# Patient Record
Sex: FEMALE | Race: WHITE | URBAN - METROPOLITAN AREA
[De-identification: names, ages, dates, MRNs, and addresses within clinical notes are randomized per-mention and may not be internally consistent; named-entity substitution may affect disease eponyms.]

---

## 2017-01-04 PROBLEM — Z37.9 NORMAL LABOR: Status: ACTIVE | Noted: 2017-01-04

## 2017-01-04 PROBLEM — O48.0 41 WEEKS GESTATION OF PREGNANCY: Status: ACTIVE | Noted: 2017-01-04

## 2017-01-04 PROBLEM — Z3A.41 41 WEEKS GESTATION OF PREGNANCY: Status: ACTIVE | Noted: 2017-01-04

## 2017-01-05 ENCOUNTER — ANESTHESIA EVENT (OUTPATIENT)
Dept: LABOR AND DELIVERY | Age: 22
End: 2017-01-05
Payer: COMMERCIAL

## 2017-01-05 ENCOUNTER — ANESTHESIA (OUTPATIENT)
Dept: LABOR AND DELIVERY | Age: 22
End: 2017-01-05
Payer: COMMERCIAL

## 2017-01-05 PROCEDURE — A4300 CATH IMPL VASC ACCESS PORTAL: HCPCS | Performed by: ANESTHESIOLOGY

## 2017-01-05 PROCEDURE — 74011250636 HC RX REV CODE- 250/636

## 2017-01-05 PROCEDURE — 77030014125 HC TY EPDRL BBMI -B: Performed by: ANESTHESIOLOGY

## 2017-01-05 RX ORDER — ROPIVACAINE HYDROCHLORIDE 2 MG/ML
INJECTION, SOLUTION EPIDURAL; INFILTRATION; PERINEURAL AS NEEDED
Status: DISCONTINUED | OUTPATIENT
Start: 2017-01-05 | End: 2017-01-05 | Stop reason: HOSPADM

## 2017-01-05 RX ORDER — ROPIVACAINE HYDROCHLORIDE 2 MG/ML
INJECTION, SOLUTION EPIDURAL; INFILTRATION; PERINEURAL
Status: DISCONTINUED | OUTPATIENT
Start: 2017-01-05 | End: 2017-01-05 | Stop reason: HOSPADM

## 2017-01-05 RX ADMIN — ROPIVACAINE HYDROCHLORIDE 10 ML/HR: 2 INJECTION, SOLUTION EPIDURAL; INFILTRATION; PERINEURAL at 09:45

## 2017-01-05 RX ADMIN — ROPIVACAINE HYDROCHLORIDE 8 ML: 2 INJECTION, SOLUTION EPIDURAL; INFILTRATION; PERINEURAL at 09:45

## 2017-01-05 NOTE — ANESTHESIA PREPROCEDURE EVALUATION
Anesthetic History   No history of anesthetic complications            Review of Systems / Medical History  Patient summary reviewed and pertinent labs reviewed    Pulmonary  Within defined limits                 Neuro/Psych   Within defined limits           Cardiovascular                  Exercise tolerance: >4 METS  Comments: Denies CV history   GI/Hepatic/Renal  Within defined limits              Endo/Other  Within defined limits           Other Findings              Physical Exam    Airway  Mallampati: II  TM Distance: 4 - 6 cm  Neck ROM: normal range of motion   Mouth opening: Normal     Cardiovascular    Rhythm: regular  Rate: normal         Dental  No notable dental hx       Pulmonary  Breath sounds clear to auscultation               Abdominal  GI exam deferred       Other Findings            Anesthetic Plan    ASA: 2  Anesthesia type: epidural            Anesthetic plan and risks discussed with: Patient and Mother

## 2017-01-05 NOTE — ANESTHESIA PROCEDURE NOTES
Epidural Block    Start time: 1/5/2017 9:38 AM  End time: 1/5/2017 9:48 AM  Performed by: Regi Perez  Authorized by: Nevaeh LEYVA     Pre-Procedure  Indication: labor epidural    Preanesthetic Checklist: patient identified, risks and benefits discussed, anesthesia consent, site marked, patient being monitored, timeout performed and anesthesia consent    Timeout Time: 09:37        Epidural:   Patient position:  Seated  Prep region:  Lumbar  Prep: Patient draped and Chlorhexidine    Location:  L3-4    Needle and Epidural Catheter:   Needle Type:  Tuohy  Needle Gauge:  17 G  Injection Technique:  Loss of resistance using saline  Attempts:  1  Catheter Size:  19 G  Catheter at Skin Depth (cm):  9  Depth in Epidural Space (cm):  5  Events: no blood with aspiration, no cerebrospinal fluid with aspiration, no paresthesia and negative aspiration test    Test Dose:  Lidocaine 1.5% w/ epi and negative    Assessment:   Catheter Secured:  Tegaderm and tape  Insertion:  Uncomplicated  Patient tolerance:  Patient tolerated the procedure well with no immediate complications

## 2017-01-05 NOTE — ANESTHESIA POSTPROCEDURE EVALUATION
Post-Anesthesia Evaluation and Assessment    Patient: Betty Richardson MRN: [id number]  SSN: [id number]    YOB: 1995  Age: 19 y.o. Sex: female       Cardiovascular Function/Vital Signs  Visit Vitals    /61    Pulse 82    Temp 36.8 °C (98.3 °F)    Resp 20    Breastfeeding Yes       Patient is status post epidural anesthesia for * No procedures listed *. Nausea/Vomiting: None    Postoperative hydration reviewed and adequate. Pain:  Pain Scale 1: Numeric (0 - 10) (01/05/17 1500)  Pain Intensity 1: 0 (01/05/17 1500)   Managed    Neurological Status: At baseline    Mental Status and Level of Consciousness: Arousable    Pulmonary Status:   O2 Device: Room air (01/05/17 0703)   Adequate oxygenation and airway patent    Complications related to anesthesia: None    Post-anesthesia assessment completed.  No concerns    Signed By: Venita Shipley MD     January 5, 2017

## 2017-01-31 PROBLEM — Z37.9 NORMAL LABOR: Status: RESOLVED | Noted: 2017-01-04 | Resolved: 2017-01-31

## 2017-01-31 PROBLEM — O48.0 41 WEEKS GESTATION OF PREGNANCY: Status: RESOLVED | Noted: 2017-01-04 | Resolved: 2017-01-31

## 2017-01-31 PROBLEM — R87.612 LGSIL ON PAP SMEAR OF CERVIX: Status: ACTIVE | Noted: 2017-01-31

## 2017-01-31 PROBLEM — Z3A.41 41 WEEKS GESTATION OF PREGNANCY: Status: RESOLVED | Noted: 2017-01-04 | Resolved: 2017-01-31

## 2020-06-11 PROBLEM — Z87.59 HISTORY OF POLYHYDRAMNIOS: Status: ACTIVE | Noted: 2020-06-11

## 2020-06-11 PROBLEM — R87.612 LGSIL ON PAP SMEAR OF CERVIX: Status: RESOLVED | Noted: 2017-01-31 | Resolved: 2020-06-11

## 2020-06-11 PROBLEM — F98.8 ADD (ATTENTION DEFICIT DISORDER): Status: ACTIVE | Noted: 2020-06-11

## 2020-06-11 PROBLEM — Z34.90 PREGNANCY: Status: ACTIVE | Noted: 2020-06-11

## 2020-12-27 ENCOUNTER — HOSPITAL ENCOUNTER (INPATIENT)
Age: 25
LOS: 3 days | Discharge: HOME | End: 2020-12-30
Attending: OBSTETRICS & GYNECOLOGY | Admitting: OBSTETRICS & GYNECOLOGY
Payer: COMMERCIAL

## 2020-12-27 PROBLEM — O26.893 ABDOMINAL PAIN DURING PREGNANCY IN THIRD TRIMESTER: Status: ACTIVE | Noted: 2020-12-27

## 2020-12-27 PROBLEM — R10.9 ABDOMINAL PAIN DURING PREGNANCY IN THIRD TRIMESTER: Status: ACTIVE | Noted: 2020-12-27

## 2020-12-27 LAB
ABO + RH BLD: NORMAL
BLOOD GROUP ANTIBODIES SERPL: NORMAL
ERYTHROCYTE [DISTWIDTH] IN BLOOD BY AUTOMATED COUNT: 13.6 % (ref 11.9–14.6)
HCT VFR BLD AUTO: 35.3 % (ref 35.8–46.3)
HGB BLD-MCNC: 11.9 G/DL (ref 11.7–15.4)
MCH RBC QN AUTO: 31.7 PG (ref 26.1–32.9)
MCHC RBC AUTO-ENTMCNC: 33.7 G/DL (ref 31.4–35)
MCV RBC AUTO: 94.1 FL (ref 79.6–97.8)
NRBC # BLD: 0 K/UL (ref 0–0.2)
PLATELET # BLD AUTO: 178 K/UL (ref 150–450)
PMV BLD AUTO: 11.6 FL (ref 9.4–12.3)
RBC # BLD AUTO: 3.75 M/UL (ref 4.05–5.2)
SPECIMEN EXP DATE BLD: NORMAL
WBC # BLD AUTO: 13.5 K/UL (ref 4.3–11.1)

## 2020-12-27 PROCEDURE — 85027 COMPLETE CBC AUTOMATED: CPT

## 2020-12-27 PROCEDURE — 4A1HXCZ MONITORING OF PRODUCTS OF CONCEPTION, CARDIAC RATE, EXTERNAL APPROACH: ICD-10-PCS | Performed by: OBSTETRICS & GYNECOLOGY

## 2020-12-27 PROCEDURE — 74011000258 HC RX REV CODE- 258: Performed by: OBSTETRICS & GYNECOLOGY

## 2020-12-27 PROCEDURE — 86901 BLOOD TYPING SEROLOGIC RH(D): CPT

## 2020-12-27 PROCEDURE — 74011250636 HC RX REV CODE- 250/636: Performed by: OBSTETRICS & GYNECOLOGY

## 2020-12-27 PROCEDURE — 59025 FETAL NON-STRESS TEST: CPT

## 2020-12-27 PROCEDURE — 99283 EMERGENCY DEPT VISIT LOW MDM: CPT

## 2020-12-27 PROCEDURE — 65270000029 HC RM PRIVATE

## 2020-12-27 RX ORDER — OXYTOCIN/RINGER'S LACTATE 30/500 ML
87.3 PLASTIC BAG, INJECTION (ML) INTRAVENOUS AS NEEDED
Status: COMPLETED | OUTPATIENT
Start: 2020-12-27 | End: 2020-12-28

## 2020-12-27 RX ORDER — LIDOCAINE HYDROCHLORIDE 20 MG/ML
JELLY TOPICAL
Status: DISCONTINUED | OUTPATIENT
Start: 2020-12-27 | End: 2020-12-28 | Stop reason: HOSPADM

## 2020-12-27 RX ORDER — MINERAL OIL
120 OIL (ML) ORAL
Status: COMPLETED | OUTPATIENT
Start: 2020-12-27 | End: 2020-12-28

## 2020-12-27 RX ORDER — OXYTOCIN/RINGER'S LACTATE 30/500 ML
10 PLASTIC BAG, INJECTION (ML) INTRAVENOUS AS NEEDED
Status: COMPLETED | OUTPATIENT
Start: 2020-12-27 | End: 2020-12-28

## 2020-12-27 RX ORDER — ONDANSETRON 2 MG/ML
4 INJECTION INTRAMUSCULAR; INTRAVENOUS
Status: DISCONTINUED | OUTPATIENT
Start: 2020-12-27 | End: 2020-12-28 | Stop reason: HOSPADM

## 2020-12-27 RX ORDER — SODIUM CHLORIDE 0.9 % (FLUSH) 0.9 %
5-40 SYRINGE (ML) INJECTION EVERY 8 HOURS
Status: DISCONTINUED | OUTPATIENT
Start: 2020-12-27 | End: 2020-12-28

## 2020-12-27 RX ORDER — CALCIUM CARBONATE 200(500)MG
400 TABLET,CHEWABLE ORAL
Status: DISCONTINUED | OUTPATIENT
Start: 2020-12-27 | End: 2020-12-28 | Stop reason: HOSPADM

## 2020-12-27 RX ORDER — DEXTROSE, SODIUM CHLORIDE, SODIUM LACTATE, POTASSIUM CHLORIDE, AND CALCIUM CHLORIDE 5; .6; .31; .03; .02 G/100ML; G/100ML; G/100ML; G/100ML; G/100ML
125 INJECTION, SOLUTION INTRAVENOUS CONTINUOUS
Status: DISCONTINUED | OUTPATIENT
Start: 2020-12-27 | End: 2020-12-28

## 2020-12-27 RX ORDER — LIDOCAINE HYDROCHLORIDE 10 MG/ML
1 INJECTION INFILTRATION; PERINEURAL
Status: DISCONTINUED | OUTPATIENT
Start: 2020-12-27 | End: 2020-12-28 | Stop reason: HOSPADM

## 2020-12-27 RX ORDER — BUTORPHANOL TARTRATE 2 MG/ML
1 INJECTION INTRAMUSCULAR; INTRAVENOUS
Status: DISCONTINUED | OUTPATIENT
Start: 2020-12-27 | End: 2020-12-28 | Stop reason: HOSPADM

## 2020-12-27 RX ORDER — SODIUM CHLORIDE 0.9 % (FLUSH) 0.9 %
5-40 SYRINGE (ML) INJECTION AS NEEDED
Status: DISCONTINUED | OUTPATIENT
Start: 2020-12-27 | End: 2020-12-28

## 2020-12-27 RX ADMIN — SODIUM CHLORIDE 5 MILLION UNITS: 900 INJECTION INTRAVENOUS at 21:55

## 2020-12-27 RX ADMIN — SODIUM CHLORIDE, SODIUM LACTATE, POTASSIUM CHLORIDE, CALCIUM CHLORIDE, AND DEXTROSE MONOHYDRATE 125 ML/HR: 600; 310; 30; 20; 5 INJECTION, SOLUTION INTRAVENOUS at 21:30

## 2020-12-28 ENCOUNTER — ANESTHESIA EVENT (OUTPATIENT)
Dept: LABOR AND DELIVERY | Age: 25
End: 2020-12-28
Payer: COMMERCIAL

## 2020-12-28 ENCOUNTER — ANESTHESIA (OUTPATIENT)
Dept: LABOR AND DELIVERY | Age: 25
End: 2020-12-28
Payer: COMMERCIAL

## 2020-12-28 PROBLEM — B95.1 POSITIVE GBS TEST: Status: ACTIVE | Noted: 2020-12-28

## 2020-12-28 LAB
ARTERIAL PATENCY WRIST A: ABNORMAL
ARTERIAL PATENCY WRIST A: ABNORMAL
BASE DEFICIT BLD-SCNC: 4 MMOL/L
BASE DEFICIT BLD-SCNC: 4 MMOL/L
BDY SITE: ABNORMAL
BDY SITE: ABNORMAL
CO2 BLD-SCNC: 25 MMOL/L
CO2 BLD-SCNC: 27 MMOL/L
COLLECT TIME,HTIME: 628
COLLECT TIME,HTIME: 628
GAS FLOW.O2 O2 DELIVERY SYS: ABNORMAL L/MIN
GAS FLOW.O2 O2 DELIVERY SYS: ABNORMAL L/MIN
HCO3 BLD-SCNC: 25.2 MMOL/L (ref 22–26)
HCO3 BLDV-SCNC: 23.1 MMOL/L (ref 23–28)
PCO2 BLDCO: 50 MMHG (ref 32–68)
PCO2 BLDCO: 61 MMHG (ref 32–68)
PH BLDCO: 7.23 [PH] (ref 7.15–7.38)
PH BLDCO: 7.27 [PH] (ref 7.15–7.38)
PO2 BLDCO: 20 MMHG
PO2 BLDCO: 9 MMHG
SAO2 % BLD: 6 % (ref 95–98)
SAO2 % BLDV: 24 % (ref 65–88)
SERVICE CMNT-IMP: ABNORMAL
SERVICE CMNT-IMP: ABNORMAL
SPECIMEN TYPE: ABNORMAL
SPECIMEN TYPE: ABNORMAL

## 2020-12-28 PROCEDURE — 74011250637 HC RX REV CODE- 250/637: Performed by: OBSTETRICS & GYNECOLOGY

## 2020-12-28 PROCEDURE — 74011000250 HC RX REV CODE- 250: Performed by: NURSE ANESTHETIST, CERTIFIED REGISTERED

## 2020-12-28 PROCEDURE — 00HU33Z INSERTION OF INFUSION DEVICE INTO SPINAL CANAL, PERCUTANEOUS APPROACH: ICD-10-PCS | Performed by: ANESTHESIOLOGY

## 2020-12-28 PROCEDURE — 82803 BLOOD GASES ANY COMBINATION: CPT

## 2020-12-28 PROCEDURE — 77030002888 HC SUT CHRMC J&J -A

## 2020-12-28 PROCEDURE — 74011250636 HC RX REV CODE- 250/636: Performed by: OBSTETRICS & GYNECOLOGY

## 2020-12-28 PROCEDURE — 75410000003 HC RECOV DEL/VAG/CSECN EA 0.5 HR

## 2020-12-28 PROCEDURE — 76060000078 HC EPIDURAL ANESTHESIA

## 2020-12-28 PROCEDURE — 77030018846 HC SOL IRR STRL H20 ICUM -A

## 2020-12-28 PROCEDURE — 74011250636 HC RX REV CODE- 250/636: Performed by: NURSE ANESTHETIST, CERTIFIED REGISTERED

## 2020-12-28 PROCEDURE — 2709999900 HC NON-CHARGEABLE SUPPLY

## 2020-12-28 PROCEDURE — 0UQGXZZ REPAIR VAGINA, EXTERNAL APPROACH: ICD-10-PCS | Performed by: OBSTETRICS & GYNECOLOGY

## 2020-12-28 PROCEDURE — 75410000002 HC LABOR FEE PER 1 HR

## 2020-12-28 PROCEDURE — 75410000000 HC DELIVERY VAGINAL/SINGLE

## 2020-12-28 PROCEDURE — A4300 CATH IMPL VASC ACCESS PORTAL: HCPCS | Performed by: ANESTHESIOLOGY

## 2020-12-28 PROCEDURE — 65270000029 HC RM PRIVATE

## 2020-12-28 PROCEDURE — 77030014125 HC TY EPDRL BBMI -B: Performed by: ANESTHESIOLOGY

## 2020-12-28 RX ORDER — ROPIVACAINE HYDROCHLORIDE 2 MG/ML
INJECTION, SOLUTION EPIDURAL; INFILTRATION; PERINEURAL AS NEEDED
Status: DISCONTINUED | OUTPATIENT
Start: 2020-12-28 | End: 2020-12-30 | Stop reason: HOSPADM

## 2020-12-28 RX ORDER — SIMETHICONE 80 MG
80 TABLET,CHEWABLE ORAL
Status: DISCONTINUED | OUTPATIENT
Start: 2020-12-28 | End: 2020-12-30 | Stop reason: HOSPADM

## 2020-12-28 RX ORDER — IBUPROFEN 800 MG/1
800 TABLET ORAL
Status: DISCONTINUED | OUTPATIENT
Start: 2020-12-28 | End: 2020-12-30 | Stop reason: HOSPADM

## 2020-12-28 RX ORDER — DIPHENHYDRAMINE HCL 25 MG
25 CAPSULE ORAL
Status: DISCONTINUED | OUTPATIENT
Start: 2020-12-28 | End: 2020-12-30 | Stop reason: HOSPADM

## 2020-12-28 RX ORDER — FENTANYL CITRATE 50 UG/ML
INJECTION, SOLUTION INTRAMUSCULAR; INTRAVENOUS AS NEEDED
Status: DISCONTINUED | OUTPATIENT
Start: 2020-12-28 | End: 2020-12-30 | Stop reason: HOSPADM

## 2020-12-28 RX ORDER — NALOXONE HYDROCHLORIDE 0.4 MG/ML
0.4 INJECTION, SOLUTION INTRAMUSCULAR; INTRAVENOUS; SUBCUTANEOUS AS NEEDED
Status: DISCONTINUED | OUTPATIENT
Start: 2020-12-28 | End: 2020-12-30 | Stop reason: HOSPADM

## 2020-12-28 RX ORDER — LIDOCAINE HYDROCHLORIDE AND EPINEPHRINE 15; 5 MG/ML; UG/ML
INJECTION, SOLUTION EPIDURAL AS NEEDED
Status: DISCONTINUED | OUTPATIENT
Start: 2020-12-28 | End: 2020-12-30 | Stop reason: HOSPADM

## 2020-12-28 RX ORDER — ROPIVACAINE HYDROCHLORIDE 2 MG/ML
INJECTION, SOLUTION EPIDURAL; INFILTRATION; PERINEURAL
Status: DISCONTINUED | OUTPATIENT
Start: 2020-12-28 | End: 2020-12-30 | Stop reason: HOSPADM

## 2020-12-28 RX ORDER — OXYCODONE AND ACETAMINOPHEN 7.5; 325 MG/1; MG/1
2 TABLET ORAL
Status: DISCONTINUED | OUTPATIENT
Start: 2020-12-28 | End: 2020-12-30 | Stop reason: HOSPADM

## 2020-12-28 RX ORDER — ZOLPIDEM TARTRATE 5 MG/1
5 TABLET ORAL
Status: DISCONTINUED | OUTPATIENT
Start: 2020-12-28 | End: 2020-12-30 | Stop reason: HOSPADM

## 2020-12-28 RX ORDER — OXYCODONE AND ACETAMINOPHEN 5; 325 MG/1; MG/1
1 TABLET ORAL
Status: DISCONTINUED | OUTPATIENT
Start: 2020-12-28 | End: 2020-12-30 | Stop reason: HOSPADM

## 2020-12-28 RX ORDER — IBUPROFEN 400 MG/1
400 TABLET ORAL
Status: DISCONTINUED | OUTPATIENT
Start: 2020-12-28 | End: 2020-12-28

## 2020-12-28 RX ADMIN — IBUPROFEN 800 MG: 800 TABLET ORAL at 21:06

## 2020-12-28 RX ADMIN — MINERAL OIL 120 ML: 1000 LIQUID ORAL at 06:03

## 2020-12-28 RX ADMIN — FENTANYL CITRATE 100 MCG: 50 INJECTION INTRAMUSCULAR; INTRAVENOUS at 01:44

## 2020-12-28 RX ADMIN — PENICILLIN G POTASSIUM 2.5 MILLION UNITS: 20000000 POWDER, FOR SOLUTION INTRAVENOUS at 02:00

## 2020-12-28 RX ADMIN — LIDOCAINE HYDROCHLORIDE,EPINEPHRINE BITARTRATE 3 ML: 15; .005 INJECTION, SOLUTION EPIDURAL; INFILTRATION; INTRACAUDAL; PERINEURAL at 00:49

## 2020-12-28 RX ADMIN — Medication 87.3 MILLI-UNITS/MIN: at 07:31

## 2020-12-28 RX ADMIN — IBUPROFEN 400 MG: 400 TABLET, FILM COATED ORAL at 07:30

## 2020-12-28 RX ADMIN — Medication 10000 MILLI-UNITS: at 06:32

## 2020-12-28 RX ADMIN — IBUPROFEN 800 MG: 800 TABLET ORAL at 15:13

## 2020-12-28 RX ADMIN — Medication 8 ML: at 00:52

## 2020-12-28 RX ADMIN — PENICILLIN G POTASSIUM 2.5 MILLION UNITS: 20000000 POWDER, FOR SOLUTION INTRAVENOUS at 06:00

## 2020-12-28 RX ADMIN — IBUPROFEN 400 MG: 400 TABLET, FILM COATED ORAL at 11:43

## 2020-12-28 RX ADMIN — ROPIVACAINE HYDROCHLORIDE 8 ML/HR: 2 INJECTION, SOLUTION EPIDURAL; INFILTRATION at 00:52

## 2020-12-28 NOTE — PROGRESS NOTES
Raheem Hinson at bedside at [place] [address]. HEBER Ngo at bedside at [address] [facility] pt to sitting up on bedside at [place] [address]. Timeout completed at [phone] with MD, HEBER and myself at bedside. Test dose given at 0049. Negative reaction. Dose given at 0052. Pt assisted to lying back in left tilt position. See anesthesia record for details. See vital sign flow sheet for BP. Tolerated procedure well.

## 2020-12-28 NOTE — ANESTHESIA PREPROCEDURE EVALUATION
Anesthetic History   No history of anesthetic complications            Review of Systems / Medical History  Patient summary reviewed and pertinent labs reviewed    Pulmonary  Within defined limits                 Neuro/Psych   Within defined limits           Cardiovascular                  Exercise tolerance: >4 METS     GI/Hepatic/Renal  Within defined limits              Endo/Other  Within defined limits           Other Findings              Physical Exam    Airway  Mallampati: II  TM Distance: 4 - 6 cm  Neck ROM: normal range of motion   Mouth opening: Normal     Cardiovascular    Rhythm: regular  Rate: normal         Dental  No notable dental hx       Pulmonary  Breath sounds clear to auscultation               Abdominal  GI exam deferred       Other Findings            Anesthetic Plan    ASA: 2  Anesthesia type: epidural            Anesthetic plan and risks discussed with: Patient and Spouse

## 2020-12-28 NOTE — LACTATION NOTE
[address] [facility] [address] [phone]               Thank you for choosing us for your health care visit with Osmin Suh MD.  We are glad to serve you and happy to provide you with this summary of your This note was copied from a baby's chart. In to see mom and infant for first time. 2nd baby. Breast fed first x 8 months. Good supply. Used nipple shield w/ first baby. States this  today has latched and fed well twice, did not use nipple shield. Reviewed 1st 24 hr feeding/output expectations. Discussed options in obtaining personal pump through insurance this time as well per parents request. Baby asleep in bassinet, fed recently. No needs or questions at this time. Current Medications          This list is accurate as of: 3/1/17 11:54 AM.  Always use your most recent med list.                Amitriptyline HCl 10 MG Tabs   TAKE ONE TABLET BY MOUTH ONCE EVERY EVENING   What changed:  See the new instructions.    Commonl

## 2020-12-28 NOTE — L&D DELIVERY NOTE
Delivery Summary    Patient: Isi Bajwa MRN: [id number]  SSN: [id number]    YOB: 1995  Age: 30 y.o. Sex: female       Information for the patient's :  Lázrao Fuentes [[id number]]       Labor Events:    Labor: No    Steroids: None   Cervical Ripening Date/Time:       Cervical Ripening Type: None   Antibiotics During Labor: Yes   Rupture Identifier:      Rupture Date/Time: 2020 7:30 PM   Rupture Type: SROM   Amniotic Fluid Volume: Moderate    Amniotic Fluid Description: Clear    Amniotic Fluid Odor:      Induction: None       Induction Date/Time:        Indications for Induction:      Augmentation: None   Augmentation Date/Time:      Indications for Augmentation:     Labor complications: None       Additional complications:        Delivery Events:  Indications For Episiotomy:     Episiotomy: None   Perineal Laceration(s): None   Repaired:     Periurethral Laceration Location:      Repaired:     Labial Laceration Location:     Repaired:     Sulcal Laceration Location:     Repaired:     Vaginal Laceration Location:     Repaired: Yes   Cervical Laceration Location:     Repaired:     Repair Suture: Chromic 3-0   Number of Repair Packets:     Estimated Blood Loss (ml): 200ml   Quantitative Blood Loss (ml)                Delivery Date: 2020    Delivery Time: 6:28 AM  Delivery Type: Vaginal, Spontaneous  Sex:  Male    Gestational Age: 37w1d   Delivery Clinician:  Christina Overton  Living Status: Living   Delivery Location: L&D 428          APGARS  One minute Five minutes Ten minutes   Skin color:            Heart rate:            Grimace:            Muscle tone:            Breathing: Totals:                Presentation: Vertex    Position: Left Occiput Anterior  Resuscitation Method:  Suctioning-bulb; Tactile Stimulation     Meconium Stained: None      Cord Information: 3 Vessels  Complications: None  Cord around:    Delayed cord clamping?  Yes  Cord clamped date/time:   Disposition of Cord Blood: Lab    Blood Gases Sent?: Yes    Placenta:  Date/Time: 2020  6:32 AM  Removal: Spontaneous      Appearance: Normal      Measurements:  Birth Weight:        Birth Length:        Head Circumference:        Chest Circumference:       Abdominal Girth: Other Providers:   FAZAL Gustafson;LEX HOLDER;;;;ADRIENNE WESTBROOK;[place]ela ALFARO, Obstetrician;Primary Nurse;Primary  Nurse;Nicu Nurse;Neonatologist;Anesthesiologist;Crna;Nurse Practitioner;Midwife;Nursery Nurse           Group B Strep: No results found for: GRBSEXT, GRBSEXT  Information for the patient's :  Lázaro Gina [[id number]]   No results found for: ABORH, PCTABR, PCTDIG, BILI, ABORHEXT, ABORH     No results for input(s): PCO2CB, PO2CB, HCO3I, SO2I, IBD, PTEMPI, SPECTI, PHICB, ISITE, IDEV, IALLEN in the last 72 hours.  over small vaginal laceration repaired with single stitch. No shoulder dystocia. Spontaneous delivery of placenta. Repair in usual fashion of superficial lac. Excellent hemostasis and cosmesis.

## 2020-12-28 NOTE — PROGRESS NOTES
Verbal report given to Hector Sibley RN  (full name & credentials) on this patient, who is now being transferred to MIU (unit) for routine progression of care. The patient is not wearing a green \"Anesthesia-Duramorph\" band.     Report consisted of patient's Situation, Background, Assessment and Recommendations (SBAR).    [place] ID bands were compared with the identification form, and verified with the patient and receiving nurse.     Information from the SBAR, Procedure Summary, Intake/Output, MAR and Recent Results and the Jung Report was reviewed with the receiving nurse; opportunity for questions and clarification provided.     Pt transferred to room 458 via w/c.

## 2020-12-28 NOTE — PROGRESS NOTES
Report received from Junie Garcia RN care assumed. Pt sitting up in bed holding baby. Assessment complete see flowsheet. No complaints at this time. Pt in bed with call light in reach.

## 2020-12-28 NOTE — PROGRESS NOTES
Admission assessment complete as noted. Patient oriented to room and unit. Plan of care reviewed and patient verbalizes understanding. Questions encouraged and answered. Patent encouraged to call for needs or concerns. Safety Teaching reviewed:   1. Hand hygiene prior to handling the infant. 2. Use of bulb syringe. 3. Bracelets with matching numbers are placed on mother and infant  4. An infant security tag  [facility]) is placed on the infant's ankle and monitored  5. All OB nurses wear pink Employee badges - do not give your baby to anyone without proper identification. 6. Never leave the baby alone in the room. 7. The infant should be placed on their back to sleep. on a firm mattress. No toys should be placed in the crib. (safe sleep video offered to view)  8. Never shake the baby (video offered to view)  9. Infant fall prevention - do not sleep with the baby, and place the baby in the crib while ambulating. 8. Mother and Baby Care booklet given to Mother.

## 2020-12-28 NOTE — PROGRESS NOTES
SBAR OUT Report: Mother    Verbal report given to Marcio Phillips RN  (full name & credentials) on this patient, who is now being transferred to MIU (unit) for routine progression of care. The patient is not wearing a green \"Anesthesia-Duramorph\" band. Report consisted of patient's Situation, Background, Assessment and Recommendations (SBAR). [place] ID bands were compared with the identification form, and verified with the patient and receiving nurse. Information from the SBAR, Procedure Summary, Intake/Output, MAR and Recent Results and the Jung Report was reviewed with the receiving nurse; opportunity for questions and clarification provided. Pt transferred to room 458 via w/c.

## 2020-12-28 NOTE — PROGRESS NOTES
SVE 4/90/-2      In and out cath for 500 cc of clear yellow urine. Placed on peanut. Some discomfort in one tiny spot of left side of abdomen. CRNA called to bedside to evaluate. See her note.

## 2020-12-28 NOTE — ROUTINE PROCESS
SBAR IN Report: Mother Verbal report received from Shen Medrano RN on this patient, who is now being transferred from L&D (unit) for routine progression of care. The patient is not wearing a green \"Anesthesia-Duramorph\" band. Report consisted of patient's Situation, Background, Assessment and Recommendations (SBAR). [place] ID bands were compared with the identification form, and verified with the patient and transferring nurse. Information from the SBAR and the Jung Report was reviewed with the transferring nurse; opportunity for questions and clarification provided.

## 2020-12-28 NOTE — ANESTHESIA PROCEDURE NOTES
Epidural Block    Patient location during procedure: OB  Start time: 12/28/2020 12:44 AM  End time: 12/28/2020 12:52 AM  Reason for block: primary anesthetic, at surgeon's request and labor epidural  Staffing  Performed: attending   Anesthesiologist: Patricia Parkinson MD  Preanesthetic Checklist  Completed: patient identified, IV checked, site marked, risks and benefits discussed, surgical consent, monitors and equipment checked, pre-op evaluation and timeout performed  Block Placement  Patient position: sitting  Prep: ChloraPrep  Sterility prep: cap, drape, gloves, hand and mask  Sedation level: no sedation  Patient monitoring: heart rate  Approach: midline  Location: lumbar  Lumbar location: L3-L4  Epidural  Loss of resistance technique: saline  Guidance: landmark technique  Needle  Needle type: Tuohy   Needle gauge: 17 G  Needle length: 9 cm  Needle insertion depth: 4.5 cm  Catheter size: 19 G  Catheter at skin depth: 9 cm  Catheter securement method: clear occlusive dressing, liquid medical adhesive and surgical tape  Test dose: negative  Assessment  Number of attempts: 1  Procedure assessment: patient tolerated procedure well with no complications

## 2020-12-28 NOTE — H&P
History & Physical    Name: Isi Bajwa MRN: [id number]  SSN: [id number]    YOB: 1995  Age: 30 y.o. Sex: female        Subjective: LOF  29 yo  at 39 wks presents with LOF starting at 1. Also c/o ctxs. Reports normal FM. Denies vb. Pregnancy has been uncomplicated. GBS is positive. Estimated Date of Delivery: 21  OB History    Para Term  AB Living   2 1 1     1   SAB TAB Ectopic Molar Multiple Live Births           0 1      # Outcome Date GA Lbr Zachariah/2nd Weight Sex Delivery Anes PTL Lv   2 Current            1 Term 17 41w1d 18:30 / 00:20 3.045 kg M Vag-Spont EPIDURAL AN N ROSANGELA         Past Medical History:   Diagnosis Date    ADD (attention deficit disorder)     Chlamydia     HPV in female     LGSIL on Pap smear of cervix     Supervision of high-risk pregnancy 10/6/2016     Past Surgical History:   Procedure Laterality Date    HX COLPOSCOPY  2018    HX TYMPANOSTOMY      HX WISDOM TEETH EXTRACTION       Social History     Occupational History    Not on file   Tobacco Use    Smoking status: Never Smoker    Smokeless tobacco: Never Used   Substance and Sexual Activity    Alcohol use: Not Currently    Drug use: No    Sexual activity: Yes     Partners: Male     Birth control/protection: None     Family History   Problem Relation Age of Onset    Diabetes Father     Hypertension Father     Heart Disease Father     Ovarian Cancer Maternal Grandmother     Stroke Paternal Grandmother     No Known Problems Mother     No Known Problems Sister     Heart Attack Maternal Grandfather     Stroke Maternal Grandfather     Stroke Paternal Grandfather        No Known Allergies  Prior to Admission medications    Medication Sig Start Date End Date Taking? Authorizing Provider   multivit 47/iron/folate 1/dha (PNV-DHA PO) Take  by mouth.    Yes Provider, Historical   simethicone (Gas-X) 125 mg capsule Take 125 mg by mouth four (4) times daily as needed for Flatulence. Provider, Historical        Review of Systems: Pertinent items are noted in HPI. 10 point ROS is otherwise negative. Objective:     Vitals:  Vitals:    20   BP:  112/77   Pulse:  88   Resp:  20   Temp:  98.6 °F (37 °C)   Weight: 79.8 kg (176 lb)    Height: 5' 2\" (1.575 m)         Physical Exam:  Patient without distress. Heart: Regular rate and rhythm  Lung: clear to auscultation throughout lung fields and normal respiratory effort  Abdomen: soft, nontender  Fundus: soft and non tender  Perineum: blood absent, amniotic fluid present  Cervical Exam: 1 cm dilated    80% effaced    -2 station    Lower Extremities:  - Edema 1+  Membranes:  Spontaneous Rupture of Membranes; Amniotic Fluid: clear fluid  Fetal Heart Rate: Baseline: 120s per minute  Variability: moderate  Accelerations: yes  Decelerations: none  Uterine contractions: not picking up at this time  Amnisure: POSITIVE    Prenatal Labs:   Lab Results   Component Value Date/Time    ABO/Rh(D) O POSITIVE 2017 07:00 PM    Rubella, External immune 2020    HBsAg, External negative 2020    HIV, External NR 2020    RPR, External NR 2020    ABO,Rh O positive 2020         Assessment/Plan: 31 yo  at 37 wks presents with SROM at 1930 and mild ctxs. Active Problems:    * No active hospital problems. *       Plan: Admit for labor. PCN per IV for GBS prophylaxis.   Epidural prn

## 2020-12-28 NOTE — LACTATION NOTE

## 2020-12-28 NOTE — PROGRESS NOTES
Fundus firm/ at umbilicus. Scan bleeding noted. Pt did have one small prune size clot expressed with no bleeding after. Pitocin still infusing. Pt denies pain. Thalia care complete. Thalia pad changed and mesh underwear applied. Pt in bed with call light in reach. Will get up ready to move to MIU.

## 2020-12-29 PROCEDURE — 74011250637 HC RX REV CODE- 250/637: Performed by: OBSTETRICS & GYNECOLOGY

## 2020-12-29 PROCEDURE — 2709999900 HC NON-CHARGEABLE SUPPLY

## 2020-12-29 PROCEDURE — 65270000029 HC RM PRIVATE

## 2020-12-29 RX ADMIN — IBUPROFEN 800 MG: 800 TABLET ORAL at 10:28

## 2020-12-29 RX ADMIN — IBUPROFEN 800 MG: 800 TABLET ORAL at 23:14

## 2020-12-29 RX ADMIN — IBUPROFEN 800 MG: 800 TABLET ORAL at 17:02

## 2020-12-29 RX ADMIN — IBUPROFEN 800 MG: 800 TABLET ORAL at 04:35

## 2020-12-29 NOTE — PROGRESS NOTES
Chart reviewed - no needs identified. SW met with patient while social distancing and wearing appropriate PPE. Patient denies any history of postpartum depression/anxiety. Patient does not have a PCP as she typically goes to urgent care. Patient denied 's offer to provide assistance with linking her with a PCP. Patient given informational packet on  mood & anxiety disorders (resources/education). Family denies any additional needs from  at this time. Family has 's contact information should any needs/questions arise.     DAMIR Castillo-RAJAN  [facility]

## 2020-12-29 NOTE — LACTATION NOTE
In to follow up with mom and infant. Mom stated that infant nursed well during the night but has been fussy today and will latch and take a few sucks and then fall asleep. Informed mom and dad that this is normal and reviewed the second night of life. Mom stated that otherwise she has no concerns. Lactation consultant will follow up tomorrow.

## 2020-12-29 NOTE — PROGRESS NOTES
Post-Partum Day Number 1 Progress Note    Patient doing well post-partum without significant complaint. Voiding without difficulty, normal lochia. Vitals:  No data found. Temp (24hrs), Av °F (36.7 °C), Min:97.9 °F (36.6 °C), Max:98 °F (36.7 °C)      Vital signs stable, afebrile. Exam:  Patient without distress. Abdomen soft, fundus firm at level of umbilicus, nontender               Perineum with normal lochia noted. Lower extremities are negative for swelling, cords or tenderness. Lab/Data Review: All lab results for the last 24 hours reviewed. Assessment and Plan:  Patient appears to be having uncomplicated post-partum course. Continue routine perineal care and maternal education. Plan discharge tomorrow if no problems occur.

## 2020-12-30 VITALS
HEART RATE: 64 BPM | RESPIRATION RATE: 16 BRPM | OXYGEN SATURATION: 97 % | SYSTOLIC BLOOD PRESSURE: 100 MMHG | DIASTOLIC BLOOD PRESSURE: 73 MMHG | HEIGHT: 62 IN | WEIGHT: 176 LBS | BODY MASS INDEX: 32.39 KG/M2 | TEMPERATURE: 97.5 F

## 2020-12-30 PROCEDURE — 74011250637 HC RX REV CODE- 250/637: Performed by: OBSTETRICS & GYNECOLOGY

## 2020-12-30 PROCEDURE — 2709999900 HC NON-CHARGEABLE SUPPLY

## 2020-12-30 RX ORDER — BREAST PUMP
1 EACH MISCELLANEOUS AS NEEDED
Qty: 1 DEVICE | Refills: 0 | Status: SHIPPED | OUTPATIENT
Start: 2020-12-30

## 2020-12-30 RX ADMIN — IBUPROFEN 800 MG: 800 TABLET ORAL at 11:08

## 2020-12-30 RX ADMIN — IBUPROFEN 800 MG: 800 TABLET ORAL at 05:10

## 2020-12-30 NOTE — PROGRESS NOTES
Patient discharged to home per MD orders. Discharge instructions reviewed with patient. Questions encouraged and answered. Patient verbalizes understanding. Patient escorted by MIU staff to private vehicle. Stable at discharge. Add 52 Modifier (Optional): no Medical Necessity Clause: This procedure was medically necessary because the lesions that were treated were: Medical Necessity Information: It is in your best interest to select a reason for this procedure from the list below. All of these items fulfill various CMS LCD requirements except the new and changing color options. Strength: Clarence Curette Text: Prior to application of cantharidin the lesions were lightly pared with a curette. Post-Care Instructions: I reviewed with the patient in detail post-care instructions. The patient understands that the treated areas should be washed off 6 to 8 hours after application. Detail Level: Detailed Consent: The patient's consent was obtained including but not limited to risks of crusting, scabbing, scarring, blistering, darker or lighter pigmentary change, recurrence, incomplete removal and infection.

## 2020-12-30 NOTE — LACTATION NOTE
In to follow up with mom and infant prior to discharge to home. Mom and dad stated that infant cluster fed some during the night. He was circumcised early this am and then would not wake up to nurse. Informed mom and dad that this is normal and he may sleep for several hours before he wakes to nurse again. Mom had requested to pump earlier as her milk is coming in. She expressed 20 ml from her left breast. She has a personal breastpump at home to use as needed. Instructed her to take her breast pump kit home with her. Reviewed discharge information and answered questions. Mom and infant are following up with [facility] and will see lactation consultant there.

## 2020-12-30 NOTE — PROGRESS NOTES
Post-Partum Day Number 2 Progress/Discharge Note    Patient doing well post-partum without significant complaint. Voiding without difficulty, normal lochia, positive flatus. Vitals:    Patient Vitals for the past 8 hrs:   BP Temp Pulse Resp SpO2   20 0746 100/73 97.5 °F (36.4 °C) 64 16 97 %     Temp (24hrs), Av.9 °F (36.6 °C), Min:97.5 °F (36.4 °C), Max:98.2 °F (36.8 °C)      Vital signs stable, afebrile. Exam:  Patient without distress. Abdomen soft, fundus firm at level of umbilicus, non tender               Perineum with normal lochia noted. Lower extremities are negative for swelling, cords or tenderness. Lab/Data Review: All lab results for the last 24 hours reviewed. Assessment and Plan:  Patient appears to be having uncomplicated post-partum course. Continue routine perineal care and maternal education. Plan discharge for today with follow up in our office in 6 weeks.

## 2020-12-30 NOTE — DISCHARGE INSTRUCTIONS
Patient Education   Discharge instruction to follow: Activity: Pelvis rest for 6 weeks     No heavy lifting over 15 lbs for 2 weeks     No driving for 2 weeks     No push/pull motion such as sweeping or vacuuming for 2 weeks     No tub baths for 6 weeks    If using love-bottle continue to use until comfortable stopping. Change sanitary pad after each urination or bowel movement. Call MD for the following:      Fever over 101 F; pain not relieved by medication; foul smelling vaginal discharge or an increase in vaginal bleeding. Take medication as prescribed. Follow up with MD as order. Vaginal Childbirth: Care Instructions  Overview     Vaginal birth means delivering a baby through the birth canal (vagina). During labor, the uterus tightens (contracts) regularly to thin and open the cervix and to push the baby out through the birth canal.  Your body will slowly heal in the next few weeks. It's easy to get too tired and overwhelmed during the first weeks after your baby is born. Changes in your hormones can shift your mood without warning. You may find it hard to meet the extra demands on your energy and time. Take it easy on yourself. Follow-up care is a key part of your treatment and safety. Be sure to make and go to all appointments, and call your doctor if you are having problems. It's also a good idea to know your test results and keep a list of the medicines you take. How can you care for yourself at home? Vaginal bleeding and cramps  · After delivery, you will have a bloody discharge from your vagina. This will turn pink within a week and then white or yellow after about 10 days. It may last for 2 to 4 weeks or longer, until the uterus has healed. Use pads instead of tampons until you stop bleeding. · Don't worry if you pass some blood clots, as long as they are smaller than a golf ball. If you have a tear or stitches in your vaginal area, change the pad at least every 4 hours.  This will help prevent soreness and infection. · You may have cramps for the first few days after childbirth. These are normal and occur as the uterus shrinks to normal size. Take an over-the-counter pain medicine, such as acetaminophen (Tylenol), ibuprofen (Advil, Motrin), or naproxen (Aleve), for cramps. Read and follow all instructions on the label. Do not take aspirin, because it can cause more bleeding. · Do not take two or more pain medicines at the same time unless the doctor told you to. Many pain medicines have acetaminophen, which is Tylenol. Too much acetaminophen (Tylenol) can be harmful. Stitches  · If you have stitches, they will dissolve on their own and don't need to be removed. Follow your doctor's instructions for cleaning the stitched area. · Put ice or a cold pack on your painful area for 10 to 20 minutes at a time, several times a day, for the first few days. Put a thin cloth between the ice and your skin. · Sit in a few inches of warm water (sitz bath) 3 times a day and after bowel movements. The warm water helps with pain and itching. If you don't have a tub, a warm shower might help. Breast fullness  · Your breasts may overfill (engorge) in the first few days after delivery. To help milk flow and to relieve pain, warm your breasts in the shower or by using warm, moist towels before nursing. · If you aren't nursing, don't put warmth on your breasts or touch your breasts. Wear a tight bra or sports bra and use ice until the fullness goes away. This usually takes 2 to 3 days. · Put ice or a cold pack on your breast after nursing to reduce swelling and pain. Put a thin cloth between the ice and your skin. Activity  · Eat a balanced diet. Don't try to lose weight by cutting calories. Keep taking your prenatal vitamins, or take a multivitamin. · Get as much rest as you can. Try to take naps when your baby sleeps during the day. · Get some exercise every day.  But don't do any heavy exercise until your doctor says it is okay. · Wait until you are healed (about 4 to 6 weeks) before you have sexual intercourse. Your doctor will tell you when it is okay to have sex. · Talk to your doctor about birth control. You can get pregnant even before your period returns. Also, you can get pregnant while you are breastfeeding. Mental health  · It's normal to have some sadness, anxiety, sleeplessness, and mood swings after you go home. If you feel upset or hopeless for more than a few days or are having trouble doing the things you need to do, talk to your doctor. Constipation and hemorrhoids  · Drink plenty of fluids, enough so that your urine is light yellow or clear like water. If you have kidney, heart, or liver disease and have to limit fluids, talk with your doctor before you increase the amount of fluids you drink. · Eat plenty of fiber each day. Have a bran muffin or bran cereal for breakfast. Try eating a piece of fruit for a mid-afternoon snack. · For painful, itchy hemorrhoids, put ice or a cold pack on the area several times a day for 10 minutes at a time. Follow this by putting a warm compress on the area for another 10 to 20 minutes or by sitting in a shallow, warm bath. When should you call for help? Call  911 anytime you think you may need emergency care. For example, call if:    · You have thoughts of harming yourself, your baby, or another person.     · You passed out (lost consciousness).     · You have chest pain, are short of breath, or cough up blood.     · You have a seizure. Call your doctor now or seek immediate medical care if:    · You have severe vaginal bleeding.     · You are dizzy or lightheaded, or you feel like you may faint.     · You have a fever.     · You have new or more pain in your belly or pelvis.     · You have symptoms of a blood clot in your leg (called a deep vein thrombosis), such as:  ? Pain in the calf, back of the knee, thigh, or groin. ?  Redness and swelling in your leg or groin.     · You have signs of preeclampsia, such as:  ? Sudden swelling of your face, hands, or feet. ? New vision problems (such as dimness, blurring, or seeing spots). ? A severe headache. Watch closely for changes in your health, and be sure to contact your doctor if:    · Your vaginal bleeding seems to be getting heavier.     · You have new or worse vaginal discharge.     · You feel sad, anxious, or hopeless for more than a few days.     · You do not get better as expected. Where can you learn more? Go to http://www.gray.com/  Enter Q237 in the search box to learn more about \"Vaginal Childbirth: Care Instructions. \"  Current as of: February 11, 2020               Content Version: 12.6  © 8359-2779 [employer]. Care instructions adapted under license by [employer] (which disclaims liability or warranty for this information). If you have questions about a medical condition or this instruction, always ask your healthcare professional. [place] [address] any warranty or liability for your use of this information.

## 2020-12-30 NOTE — PROGRESS NOTES
Shift assessment complete as noted. Patient has questions regarding pumping. RN spends several minutes answering questions. POC for the day reviewed. Encouraged to call for needs or concerns. Verbalizes understanding.

## 2020-12-30 NOTE — LACTATION NOTE
This note was copied from a baby's chart. RN to room per mother request.  Infant just returned from circumcision. Mother notes her milk is coming in and would like to pump. Mother states \"I'm leaking and my breasts of full\". Denies discomfort. Breasts are firm bilaterally although no large areas of firmness noted. Educated on care of breasts at home to help with firmness and prevent engorgement. RN offers to help latch infant. Infant latches to right side but does not maintain latch for more than 5 minutes. Pump and parts provided per request.  RN recommends mother pump only long enough to pull off acsess milk. Voices understanding.

## 2020-12-30 NOTE — ANESTHESIA POSTPROCEDURE EVALUATION
* No procedures listed *. No value filed.     Anesthesia Post Evaluation        Patient location during evaluation: floor  Patient participation: complete - patient participated  Pain management: adequate  Airway patency: patent  Anesthetic complications: no  Cardiovascular status: acceptable  Respiratory status: acceptable  Hydration status: acceptable  Post anesthesia nausea and vomiting:  controlled  Final Post Anesthesia Temperature Assessment:  Normothermia (36.0-37.5 degrees C)      INITIAL Post-op Vital signs:  Visit Vitals  /73 (BP 1 Location: Left arm, BP Patient Position: At rest)   Pulse 64   Temp 36.4 °C (97.5 °F)   Resp 16   Ht 5' 2\" (1.575 m)   Wt 79.8 kg (176 lb)   SpO2 97%   Breastfeeding Unknown   BMI 32.19 kg/m²

## 2021-01-04 NOTE — DISCHARGE SUMMARY
Obstetrical Discharge Summary     Name: Corinne Owens MRN: [id number]  SSN: [id number]    YOB: 1995  Age: 30 y.o. Sex: female      Allergies: Patient has no known allergies. Admit Date: 2020    Discharge Date: 2021     Admitting Physician: Minda Spaulding MD     Attending Physician:  No att. providers found     * Admission Diagnoses: Abdominal pain during pregnancy in third trimester [O26.893, R10.9]    * Discharge Diagnoses:   Information for the patient's :  Mer[facility] Montes [[id number]]   Delivery of a 6 lb 4.9 oz (2.86 kg) male infant via Vaginal, Spontaneous on 2020 at 6:28 AM  by [employer]. Apgars were 9  and 9 . Additional Diagnoses:   Hospital Problems as of 2020 Date Reviewed: 2020          Codes Class Noted - Resolved POA    * (Principal) Abdominal pain during pregnancy in third trimester ICD-10-CM: O26.893, R10.9  ICD-9-CM: 646.83, 789.00  2020 - Present Unknown             Lab Results   Component Value Date/Time    ABO/Rh(D) O POSITIVE 2020 09:32 PM    Rubella, External immune 2020    ABO,Rh O positive 2020      Immunization History   Administered Date(s) Administered    MMR 2017    Tdap 2016       * Procedures:   * No surgery found *           * Discharge Condition: good    * Hospital Course: Normal hospital course following the delivery. * Disposition: Home    Discharge Medications:   Discharge Medication List as of 2020 10:24 AM      CONTINUE these medications which have NOT CHANGED    Details   multivit 47/iron/folate 1/dha (PNV-DHA PO) Take  by mouth., Historical Med         STOP taking these medications       simethicone (Gas-X) 125 mg capsule Comments:   Reason for Stopping:               * Follow-up Care/Patient Instructions:   Activity: No sex for 6 weeks      Follow-up Information     Follow up With Specialties Details Why Contact Info    None    None ([phone]) Patient stated that they have no PCP      Jovita Robledo, DO Obstetrics & Gynecology, Gynecology, Obstetrics In 2 weeks  [address] [place] [address] 32  [address]  [phone]

## 2021-01-18 PROBLEM — R10.9 ABDOMINAL PAIN DURING PREGNANCY IN THIRD TRIMESTER: Status: RESOLVED | Noted: 2020-12-27 | Resolved: 2021-01-18

## 2021-01-18 PROBLEM — Z34.90 PREGNANCY: Status: RESOLVED | Noted: 2020-06-11 | Resolved: 2021-01-18

## 2021-01-18 PROBLEM — O26.893 ABDOMINAL PAIN DURING PREGNANCY IN THIRD TRIMESTER: Status: RESOLVED | Noted: 2020-12-27 | Resolved: 2021-01-18

## 2021-01-18 PROBLEM — Z87.59 HISTORY OF POLYHYDRAMNIOS: Status: RESOLVED | Noted: 2020-06-11 | Resolved: 2021-01-18

## 2022-03-18 PROBLEM — F98.8 ADD (ATTENTION DEFICIT DISORDER): Status: ACTIVE | Noted: 2020-06-11

## 2022-03-19 PROBLEM — B95.1 POSITIVE GBS TEST: Status: ACTIVE | Noted: 2020-12-28

## 2022-08-05 ENCOUNTER — INITIAL PRENATAL (OUTPATIENT)
Dept: OBGYN CLINIC | Age: 27
End: 2022-08-05
Payer: COMMERCIAL

## 2022-08-05 VITALS — WEIGHT: 161.8 LBS | HEIGHT: 62 IN | BODY MASS INDEX: 29.77 KG/M2

## 2022-08-05 DIAGNOSIS — Z34.81 PRENATAL CARE, SUBSEQUENT PREGNANCY, FIRST TRIMESTER: Primary | ICD-10-CM

## 2022-08-05 DIAGNOSIS — Z32.01 PREGNANCY TEST POSITIVE: ICD-10-CM

## 2022-08-05 DIAGNOSIS — O36.80X0 ENCOUNTER TO DETERMINE FETAL VIABILITY OF PREGNANCY, SINGLE OR UNSPECIFIED FETUS: ICD-10-CM

## 2022-08-05 DIAGNOSIS — Z3A.08 8 WEEKS GESTATION OF PREGNANCY: ICD-10-CM

## 2022-08-05 DIAGNOSIS — Z23 ENCOUNTER FOR IMMUNIZATION: ICD-10-CM

## 2022-08-05 DIAGNOSIS — Z87.59 HISTORY OF POLYHYDRAMNIOS: ICD-10-CM

## 2022-08-05 PROBLEM — B95.1 POSITIVE GBS TEST: Status: RESOLVED | Noted: 2020-12-28 | Resolved: 2022-08-05

## 2022-08-05 PROBLEM — Z34.90 PREGNANCY: Status: ACTIVE | Noted: 2022-08-05

## 2022-08-05 LAB
ABO + RH BLD: NORMAL
ABO, EXTERNAL RESULT: NORMAL
BASOPHILS # BLD: 0 K/UL (ref 0–0.2)
BASOPHILS NFR BLD: 0 % (ref 0–2)
BLOOD GROUP ANTIBODIES SERPL: NORMAL
DIFFERENTIAL METHOD BLD: NORMAL
EOSINOPHIL # BLD: 0.1 K/UL (ref 0–0.8)
EOSINOPHIL NFR BLD: 1 % (ref 0.5–7.8)
ERYTHROCYTE [DISTWIDTH] IN BLOOD BY AUTOMATED COUNT: 13.4 % (ref 11.9–14.6)
HBV SURFACE AG SER QL: NONREACTIVE
HCG, PREGNANCY, URINE, POC: POSITIVE
HCT VFR BLD AUTO: 38.2 % (ref 35.8–46.3)
HEP B, EXTERNAL RESULT: NORMAL
HEPATITIS C ANTIBODY, EXTERNAL RESULT: NORMAL
HGB BLD-MCNC: 12.7 G/DL (ref 11.7–15.4)
HIV 1+2 AB+HIV1 P24 AG SERPL QL IA: NONREACTIVE
HIV 1/2 RESULT COMMENT: NORMAL
HIV, EXTERNAL RESULT: NORMAL
IMM GRANULOCYTES # BLD AUTO: 0 K/UL (ref 0–0.5)
IMM GRANULOCYTES NFR BLD AUTO: 0 % (ref 0–5)
LYMPHOCYTES # BLD: 1.6 K/UL (ref 0.5–4.6)
LYMPHOCYTES NFR BLD: 19 % (ref 13–44)
MCH RBC QN AUTO: 31 PG (ref 26.1–32.9)
MCHC RBC AUTO-ENTMCNC: 33.2 G/DL (ref 31.4–35)
MCV RBC AUTO: 93.2 FL (ref 79.6–97.8)
MONOCYTES # BLD: 0.4 K/UL (ref 0.1–1.3)
MONOCYTES NFR BLD: 5 % (ref 4–12)
NEUTS SEG # BLD: 6.2 K/UL (ref 1.7–8.2)
NEUTS SEG NFR BLD: 75 % (ref 43–78)
NRBC # BLD: 0 K/UL (ref 0–0.2)
PLATELET # BLD AUTO: 214 K/UL (ref 150–450)
PMV BLD AUTO: 10.7 FL (ref 9.4–12.3)
RBC # BLD AUTO: 4.1 M/UL (ref 4.05–5.2)
RH FACTOR, EXTERNAL RESULT: POSITIVE
RPR, EXTERNAL RESULT: NORMAL
RUBELLA TITER, EXTERNAL RESULT: NORMAL
RUBV IGG SERPL IA-ACNC: 37.3 IU/ML (ref 0–50)
VALID INTERNAL CONTROL, POC: YES
WBC # BLD AUTO: 8.3 K/UL (ref 4.3–11.1)

## 2022-08-05 PROCEDURE — 76817 TRANSVAGINAL US OBSTETRIC: CPT | Performed by: NURSE PRACTITIONER

## 2022-08-05 PROCEDURE — 81025 URINE PREGNANCY TEST: CPT | Performed by: NURSE PRACTITIONER

## 2022-08-05 NOTE — PROGRESS NOTES
Jennifer Montes G3, P2 presents to the office today for NOB talk and ultrasound. EDC is 3/14/23 based off of ultrasound. Patient education was discussed including: nutrition, appropriate weight gain, diet, exercise, travel, hospital classes, breastfeeding/lactation services, flu vaccine, Tdap, glucola, GBS, and Corona Virus and Zika precautions. Genetic testing was declined. Patients past medical history is significant for ADD and polyhydramnios in 2017. She is to return to the office in 4 weeks for NOB exam. All questions answered and she voiced full understanding. She is encouraged to call the office with any questions or concerns.

## 2022-08-06 LAB
EST. AVERAGE GLUCOSE BLD GHB EST-MCNC: 91 MG/DL
HBA1C MFR BLD: 4.8 % (ref 4.8–5.6)

## 2022-08-07 LAB
HCV AB S/CO SERPL IA: <0.1 S/CO RATIO (ref 0–0.9)
HCV AB SERPL QL IA: NORMAL

## 2022-08-08 LAB
BACTERIA SPEC CULT: NORMAL
RPR SER QL: NONREACTIVE
SERVICE CMNT-IMP: NORMAL
VZV IGG SER IA-ACNC: 246 INDEX

## 2022-08-09 DIAGNOSIS — Z87.59 HISTORY OF POLYHYDRAMNIOS: ICD-10-CM

## 2022-09-02 ENCOUNTER — ROUTINE PRENATAL (OUTPATIENT)
Dept: OBGYN CLINIC | Age: 27
End: 2022-09-02

## 2022-09-02 VITALS — DIASTOLIC BLOOD PRESSURE: 70 MMHG | WEIGHT: 160.6 LBS | BODY MASS INDEX: 29.37 KG/M2 | SYSTOLIC BLOOD PRESSURE: 110 MMHG

## 2022-09-02 DIAGNOSIS — Z34.81 PRENATAL CARE, SUBSEQUENT PREGNANCY, FIRST TRIMESTER: ICD-10-CM

## 2022-09-02 DIAGNOSIS — Z3A.12 12 WEEKS GESTATION OF PREGNANCY: Primary | ICD-10-CM

## 2022-09-02 DIAGNOSIS — Z13.89 SCREENING FOR GENITOURINARY CONDITION: ICD-10-CM

## 2022-09-02 DIAGNOSIS — Z11.3 SCREENING EXAMINATION FOR STD (SEXUALLY TRANSMITTED DISEASE): ICD-10-CM

## 2022-09-02 DIAGNOSIS — Z12.4 SCREENING FOR CERVICAL CANCER: ICD-10-CM

## 2022-09-02 LAB
C. TRACHOMATIS, EXTERNAL RESULT: NEGATIVE
N. GONORRHOEAE, EXTERNAL RESULT: NEGATIVE

## 2022-09-02 PROCEDURE — 99902 PR PRENATAL VISIT: CPT | Performed by: OBSTETRICS & GYNECOLOGY

## 2022-09-02 NOTE — PROGRESS NOTES
Ms. Reynaldo Galo  presents today for her initial OB exam.  She has occ nausea that is helped with frequent eating. Last pap smear:2019 Negative  Genetic testing: Declines    No LMP recorded. Patient is pregnant. Estimated Date of Delivery: 3/14/23  OB History:   OB History    Para Term  AB Living   3 2 2 0 0 2   SAB IAB Ectopic Molar Multiple Live Births   0 0 0 0 0 2      # Outcome Date GA Lbr Say/2nd Weight Sex Delivery Anes PTL Lv   3 Current            2 Term 20 37w1d 10:30 / 00:28 6 lb 4.9 oz (2.86 kg) M Vag-Spont EPI N LINDA      Name: Noemi Bates: 9  Apgar5: 9   1 Term 17 41w1d 18:30 / 00:20 6 lb 11.4 oz (3.045 kg) M Vag-Spont  N LINDA      Name: Noemi Bates: 9  Apgar5: 10     Lives in [facility] 89 years old   Past Gyn History:   GYN History         Regular cycles. Hx of cryo? In past.  Std with tx. Allergies:   No Known Allergies    Medication History:  Current Outpatient Medications   Medication Sig Dispense Refill    Prenat w/o U-QD-Psewxxz-FA-DHA (PNV-DHA PO) Take by mouth       No current facility-administered medications for this visit. Past Medical History:  Past Medical History:   Diagnosis Date    ADD (attention deficit disorder)     Chlamydia     HPV in female     LGSIL on Pap smear of cervix     Supervision of high-risk pregnancy 10/6/2016       Past Surgical History:  Past Surgical History:   Procedure Laterality Date    COLPOSCOPY  2018    TYMPANOSTOMY TUBE PLACEMENT      WISDOM TOOTH EXTRACTION         Social History:  Social History     Socioeconomic History    Marital status:  2.5 years.       Spouse name: Not on file    Number of children: Not on file    Years of education: Not on file    Highest education level: Not on file   Occupational History    Dental hygienist    Tobacco Use    Smoking status: Never    Smokeless tobacco: Never   Vaping Use    Vaping Use: Never used   Substance and Sexual Activity    Alcohol use: Not Currently    Drug use: No    Sexual activity: Yes     Partners: Male     Birth control/protection: None   Other Topics Concern    Exercise:  active    Social History Narrative    Not on file     Social Determinants of Health     Financial Resource Strain: Not on file   Food Insecurity: Not on file   Transportation Needs: Not on file   Physical Activity: Not on file   Stress: Not on file   Social Connections: Not on file   Intimate Partner Violence: Not on file   Housing Stability: Not on file       Family History:  Family History   Problem Relation Age of Onset    Stroke Paternal Grandmother     Stroke Paternal Grandfather     Stroke Maternal Grandfather     Heart Attack Maternal Grandfather     No Known Problems Sister     No Known Problems Mother     Ovarian Cancer Maternal Grandmother     Heart Disease Father     Hypertension Father     Diabetes Father            Review of Systems      A comprehensive review of systems was negative except for that written in the history of present illness. /70   Wt 160 lb 9.6 oz (72.8 kg)   BMI 29.37 kg/m²   Protein, Urine, POC   Date Value Ref Range Status   12/24/2020 Negative Negative Final   General: well nourished well developed female in nad   Heart rrr  Lungs cta b&s  Abdomen soft nontender. No enlargement of liver. Extremities: no calf pain  Pelvic exam: normal external genitalia, vulva, vagina, cervix, uterus and adnexa. Breasts: breasts appear normal, no suspicious masses, no skin or nipple changes or axillary nodes. Assessment and Plan:     Diagnoses and all orders for this visit:    12 weeks gestation of pregnancy  -     AMB POC URINALYSIS DIP STICK AUTO W/O MICRO  -     PAP IG, CT-NG-TV, rfx Aptima HPV ASCUS ([id number]);  Future    Prenatal care, subsequent pregnancy, first trimester  -     AMB POC URINALYSIS DIP STICK AUTO W/O MICRO  -     PAP IG, CT-NG-TV, rfx Aptima HPV ASCUS ([id number]); Future    Screening for cervical cancer  -     PAP IG, CT-NG-TV, rfx Aptima HPV ASCUS ([id number]); Future    Screening examination for STD (sexually transmitted disease)  -     PAP IG, CT-NG-TV, rfx Aptima HPV ASCUS ([id number]); Future    Screening for genitourinary condition  -     AMB POC URINALYSIS DIP STICK AUTO W/O MICRO        Counseling was performed regarding expected weight gain, exercise, travel and limitation of travel to [place] affected areas, risks of PTL,  avoidance of cat feces and raw material.  Discussed genetic testing and pt declines. All questions were answered. RTC 4 weeks.

## 2022-09-07 LAB
C TRACH RRNA CVX QL NAA+PROBE: NEGATIVE
CYTOLOGIST CVX/VAG CYTO: NORMAL
CYTOLOGY CVX/VAG DOC THIN PREP: NORMAL
HPV REFLEX: NORMAL
Lab: NORMAL
N GONORRHOEA RRNA CVX QL NAA+PROBE: NEGATIVE
PATH REPORT.FINAL DX SPEC: NORMAL
STAT OF ADQ CVX/VAG CYTO-IMP: NORMAL
T VAGINALIS RRNA SPEC QL NAA+PROBE: NEGATIVE

## 2022-09-30 ENCOUNTER — ROUTINE PRENATAL (OUTPATIENT)
Dept: OBGYN CLINIC | Age: 27
End: 2022-09-30

## 2022-09-30 VITALS — BODY MASS INDEX: 29.85 KG/M2 | WEIGHT: 163.2 LBS | DIASTOLIC BLOOD PRESSURE: 62 MMHG | SYSTOLIC BLOOD PRESSURE: 110 MMHG

## 2022-09-30 DIAGNOSIS — Z34.82 PRENATAL CARE, SUBSEQUENT PREGNANCY, SECOND TRIMESTER: Primary | ICD-10-CM

## 2022-09-30 DIAGNOSIS — Z3A.16 16 WEEKS GESTATION OF PREGNANCY: ICD-10-CM

## 2022-09-30 DIAGNOSIS — Z13.89 SCREENING FOR GENITOURINARY CONDITION: ICD-10-CM

## 2022-09-30 DIAGNOSIS — Z87.59 HISTORY OF POLYHYDRAMNIOS: ICD-10-CM

## 2022-09-30 LAB
GLUCOSE URINE, POC: NEGATIVE
PROTEIN,URINE, POC: NEGATIVE

## 2022-09-30 PROCEDURE — 99902 PR PRENATAL VISIT: CPT | Performed by: OBSTETRICS & GYNECOLOGY

## 2022-10-28 ENCOUNTER — ROUTINE PRENATAL (OUTPATIENT)
Dept: OBGYN CLINIC | Age: 27
End: 2022-10-28
Payer: COMMERCIAL

## 2022-10-28 VITALS — BODY MASS INDEX: 29.52 KG/M2 | SYSTOLIC BLOOD PRESSURE: 114 MMHG | WEIGHT: 161.4 LBS | DIASTOLIC BLOOD PRESSURE: 68 MMHG

## 2022-10-28 DIAGNOSIS — Z36.3 ENCOUNTER FOR ROUTINE SCREENING FOR FETAL MALFORMATION USING ULTRASOUND: Primary | ICD-10-CM

## 2022-10-28 DIAGNOSIS — Z34.82 PRENATAL CARE, SUBSEQUENT PREGNANCY, SECOND TRIMESTER: ICD-10-CM

## 2022-10-28 DIAGNOSIS — Z3A.20 20 WEEKS GESTATION OF PREGNANCY: ICD-10-CM

## 2022-10-28 DIAGNOSIS — Z13.89 SCREENING FOR GENITOURINARY CONDITION: ICD-10-CM

## 2022-10-28 LAB
GLUCOSE URINE, POC: NEGATIVE
PROTEIN,URINE, POC: NEGATIVE

## 2022-10-28 PROCEDURE — 76805 OB US >/= 14 WKS SNGL FETUS: CPT | Performed by: NURSE PRACTITIONER

## 2022-10-28 PROCEDURE — 99902 PR PRENATAL VISIT: CPT | Performed by: NURSE PRACTITIONER

## 2022-10-28 NOTE — PROGRESS NOTES
Doing well. No complaints. Suboptimal IVS. Unable to see spine due to fetal position. Currently all visualied anatomy WNL. RTC 4 weeks for recheck anatomy. Anatomy:   Single IUP + FHM. FHR = 411  Cephalic presentation  Anatomy scan not complete. Unable to visualize spine due to fetal position.  Suboptimal IVS.  EIF noted in left ventricle  Placenta is posterior grade 0  Cervix = 4.6 cm

## 2022-11-23 ENCOUNTER — ROUTINE PRENATAL (OUTPATIENT)
Dept: OBGYN CLINIC | Age: 27
End: 2022-11-23
Payer: COMMERCIAL

## 2022-11-23 VITALS — SYSTOLIC BLOOD PRESSURE: 110 MMHG | DIASTOLIC BLOOD PRESSURE: 60 MMHG | WEIGHT: 170.2 LBS | BODY MASS INDEX: 31.13 KG/M2

## 2022-11-23 DIAGNOSIS — Z3A.24 24 WEEKS GESTATION OF PREGNANCY: ICD-10-CM

## 2022-11-23 DIAGNOSIS — Z13.89 SCREENING FOR GENITOURINARY CONDITION: ICD-10-CM

## 2022-11-23 DIAGNOSIS — Z34.82 PRENATAL CARE, SUBSEQUENT PREGNANCY, SECOND TRIMESTER: ICD-10-CM

## 2022-11-23 DIAGNOSIS — Z36.2 ENCOUNTER FOR FOLLOW-UP ULTRASOUND OF FETAL ANATOMY: Primary | ICD-10-CM

## 2022-11-23 LAB
GLUCOSE URINE, POC: NEGATIVE
PROTEIN,URINE, POC: NEGATIVE

## 2022-11-23 PROCEDURE — 76816 OB US FOLLOW-UP PER FETUS: CPT | Performed by: NURSE PRACTITIONER

## 2022-11-23 PROCEDURE — 99902 PR PRENATAL VISIT: CPT | Performed by: NURSE PRACTITIONER

## 2022-11-23 NOTE — PROGRESS NOTES
Doing well. No complaints. Anatomy scan complete. RTC 4 weeks for glucola. PTL precautions and [address] reviewed. Single IUP +FHM FHR = 135  Presentation cephalic  Anatomy scan complete. Visualized anatomy WNL. Spine and IVS seen today. EIF still noted in LV.    Placenta is posterior grade 0

## 2022-12-22 ENCOUNTER — ROUTINE PRENATAL (OUTPATIENT)
Dept: OBGYN CLINIC | Age: 27
End: 2022-12-22

## 2022-12-22 VITALS — SYSTOLIC BLOOD PRESSURE: 118 MMHG | BODY MASS INDEX: 31.53 KG/M2 | WEIGHT: 172.4 LBS | DIASTOLIC BLOOD PRESSURE: 79 MMHG

## 2022-12-22 DIAGNOSIS — Z3A.28 28 WEEKS GESTATION OF PREGNANCY: Primary | ICD-10-CM

## 2022-12-22 DIAGNOSIS — Z13.1 SCREENING FOR DIABETES MELLITUS: ICD-10-CM

## 2022-12-22 DIAGNOSIS — Z13.89 SCREENING FOR GENITOURINARY CONDITION: ICD-10-CM

## 2022-12-22 DIAGNOSIS — Z13.0 SCREENING, ANEMIA, DEFICIENCY, IRON: ICD-10-CM

## 2022-12-22 DIAGNOSIS — Z34.92 PRENATAL CARE, SECOND TRIMESTER: ICD-10-CM

## 2022-12-22 DIAGNOSIS — Z23 ENCOUNTER FOR IMMUNIZATION: ICD-10-CM

## 2022-12-22 PROCEDURE — 99902 PR PRENATAL VISIT: CPT | Performed by: STUDENT IN AN ORGANIZED HEALTH CARE EDUCATION/TRAINING PROGRAM

## 2022-12-22 RX ORDER — AMOXICILLIN 500 MG/1
CAPSULE ORAL
COMMUNITY
Start: 2022-12-15

## 2022-12-22 NOTE — PROGRESS NOTES
Currently on amoxicillin for a ruptured ear drum. Glucola performed today. Reports edema in bilateral ankles. Denies HA, floaters, blurry vision. Tdap considering.

## 2022-12-23 ENCOUNTER — TELEPHONE (OUTPATIENT)
Dept: OBGYN CLINIC | Age: 27
End: 2022-12-23

## 2022-12-23 DIAGNOSIS — Z13.1 SCREENING FOR DIABETES MELLITUS: Primary | ICD-10-CM

## 2022-12-23 DIAGNOSIS — Z34.83 PRENATAL CARE, SUBSEQUENT PREGNANCY, THIRD TRIMESTER: ICD-10-CM

## 2022-12-23 DIAGNOSIS — Z3A.29 29 WEEKS GESTATION OF PREGNANCY: ICD-10-CM

## 2022-12-23 NOTE — TELEPHONE ENCOUNTER
----- Message from Moris Jiménez MD sent at 12/23/2022  9:51 AM EST -----  Lab for pregnant patient.   Failed 1-hr GTT, needs 3-hr GCT

## 2022-12-23 NOTE — TELEPHONE ENCOUNTER
Called patient and LM informed of abnormal GTT. 3 hour GTT scheduled. NPO after midnight. Patient v/u.     Orders Placed This Encounter   Procedures    Gestational GTT, 3 HR     Standing Status:   Future     Standing Expiration Date:   12/30/2022

## 2023-01-06 ENCOUNTER — ROUTINE PRENATAL (OUTPATIENT)
Dept: OBGYN CLINIC | Age: 28
End: 2023-01-06

## 2023-01-06 VITALS — WEIGHT: 173.2 LBS | BODY MASS INDEX: 31.68 KG/M2 | SYSTOLIC BLOOD PRESSURE: 120 MMHG | DIASTOLIC BLOOD PRESSURE: 74 MMHG

## 2023-01-06 DIAGNOSIS — Z87.59 HISTORY OF POLYHYDRAMNIOS: ICD-10-CM

## 2023-01-06 DIAGNOSIS — Z23 ENCOUNTER FOR IMMUNIZATION: ICD-10-CM

## 2023-01-06 DIAGNOSIS — Z3A.30 30 WEEKS GESTATION OF PREGNANCY: ICD-10-CM

## 2023-01-06 DIAGNOSIS — Z34.93 PRENATAL CARE, THIRD TRIMESTER: Primary | ICD-10-CM

## 2023-01-06 DIAGNOSIS — Z13.89 SCREENING FOR GENITOURINARY CONDITION: ICD-10-CM

## 2023-01-06 LAB
GLUCOSE URINE, POC: NEGATIVE
PROTEIN,URINE, POC: NEGATIVE

## 2023-01-06 NOTE — PROGRESS NOTES
32 y.o.  at 30w3d  who is here for routine OB visit. Tdap desires, will give today. HISTORY:  OB History    Para Term  AB Living   3 2 2     2   SAB IAB Ectopic Molar Multiple Live Births             2      # Outcome Date GA Lbr Say/2nd Weight Sex Delivery Anes PTL Lv   3 Current            2 Term 20 37w1d 10:30 / 00:28 6 lb 4.9 oz (2.86 kg) M Vag-Spont EPI N LINDA   1 Term 17 41w1d 18:30 / 00:20 6 lb 11.4 oz (3.045 kg) M Vag-Spont  N LINDA      No LMP recorded. Patient is pregnant. Social History     Substance and Sexual Activity   Sexual Activity Yes    Partners: Male    Birth control/protection: None      Past Medical History:   Diagnosis Date    ADD (attention deficit disorder)     Chlamydia     HPV in female     LGSIL on Pap smear of cervix     Supervision of high-risk pregnancy 10/6/2016      Past Surgical History:   Procedure Laterality Date    COLPOSCOPY  2018    TYMPANOSTOMY TUBE PLACEMENT      WISDOM TOOTH EXTRACTION         ROS:  Feeling well. No dyspnea or chest pain on exertion. No abdominal pain, change in bowel habits, black or bloody stools. No urinary tract symptoms. OB ROS: No vaginal bleeding, cxns, LOF, decreased FM. No HA, vision changes, abdominal pain. PHYSICAL EXAM:  /74   Wt 173 lb 3.2 oz (78.6 kg)   BMI 31.68 kg/m²        ASSESSMENT / PLAN:  1. Prenatal care, third trimester  -     AMB POC OB URINE DIP  2. 30 weeks gestation of pregnancy  Overview:  Genetics-declines  Orders:  -     AMB POC OB URINE DIP  3. History of polyhydramnios  Overview:  2017  A1C (4.8)  28 week GTT-153  3 hour GTT-passed 79, 187, 155, 128  4. Encounter for immunization  Overview:  Covid-not received  Flu- declined  Tdap- given 23  5.  Screening for genitourinary condition  -     AMB POC OB URINE DIP         Nav Hinojosa MD

## 2023-01-20 ENCOUNTER — ROUTINE PRENATAL (OUTPATIENT)
Dept: OBGYN CLINIC | Age: 28
End: 2023-01-20

## 2023-01-20 VITALS — WEIGHT: 174.2 LBS | SYSTOLIC BLOOD PRESSURE: 114 MMHG | DIASTOLIC BLOOD PRESSURE: 70 MMHG | BODY MASS INDEX: 31.86 KG/M2

## 2023-01-20 DIAGNOSIS — Z13.89 SCREENING FOR GENITOURINARY CONDITION: ICD-10-CM

## 2023-01-20 DIAGNOSIS — Z3A.32 32 WEEKS GESTATION OF PREGNANCY: ICD-10-CM

## 2023-01-20 DIAGNOSIS — Z34.83 PRENATAL CARE, SUBSEQUENT PREGNANCY, THIRD TRIMESTER: Primary | ICD-10-CM

## 2023-01-20 LAB
GLUCOSE URINE, POC: NEGATIVE
PROTEIN,URINE, POC: NEGATIVE

## 2023-02-03 ENCOUNTER — ROUTINE PRENATAL (OUTPATIENT)
Dept: OBGYN CLINIC | Age: 28
End: 2023-02-03

## 2023-02-03 VITALS — SYSTOLIC BLOOD PRESSURE: 122 MMHG | WEIGHT: 174.8 LBS | BODY MASS INDEX: 31.97 KG/M2 | DIASTOLIC BLOOD PRESSURE: 68 MMHG

## 2023-02-03 DIAGNOSIS — Z34.83 PRENATAL CARE, SUBSEQUENT PREGNANCY, THIRD TRIMESTER: Primary | ICD-10-CM

## 2023-02-03 DIAGNOSIS — Z3A.34 34 WEEKS GESTATION OF PREGNANCY: ICD-10-CM

## 2023-02-03 DIAGNOSIS — Z13.89 SCREENING FOR GENITOURINARY CONDITION: ICD-10-CM

## 2023-02-03 LAB
GLUCOSE URINE, POC: NEGATIVE
PROTEIN,URINE, POC: NEGATIVE

## 2023-02-03 PROCEDURE — MISCGLOBALOB GLOBAL OB: Performed by: OBSTETRICS & GYNECOLOGY

## 2023-02-17 ENCOUNTER — ROUTINE PRENATAL (OUTPATIENT)
Dept: OBGYN CLINIC | Age: 28
End: 2023-02-17

## 2023-02-17 VITALS — DIASTOLIC BLOOD PRESSURE: 74 MMHG | BODY MASS INDEX: 32.37 KG/M2 | SYSTOLIC BLOOD PRESSURE: 134 MMHG | WEIGHT: 177 LBS

## 2023-02-17 DIAGNOSIS — Z87.59 HISTORY OF POLYHYDRAMNIOS: Primary | ICD-10-CM

## 2023-02-17 DIAGNOSIS — Z36.85 ANTENATAL SCREENING FOR STREPTOCOCCUS B: ICD-10-CM

## 2023-02-17 DIAGNOSIS — Z13.89 SCREENING FOR GENITOURINARY CONDITION: ICD-10-CM

## 2023-02-17 DIAGNOSIS — Z34.83 PRENATAL CARE, SUBSEQUENT PREGNANCY, THIRD TRIMESTER: ICD-10-CM

## 2023-02-17 DIAGNOSIS — Z3A.36 36 WEEKS GESTATION OF PREGNANCY: ICD-10-CM

## 2023-02-17 LAB
GLUCOSE URINE, POC: NEGATIVE
PROTEIN,URINE, POC: NEGATIVE

## 2023-02-19 LAB
BACTERIA SPEC CULT: NORMAL
SERVICE CMNT-IMP: NORMAL

## 2023-02-20 ENCOUNTER — TELEPHONE (OUTPATIENT)
Dept: OBGYN CLINIC | Age: 28
End: 2023-02-20

## 2023-02-20 NOTE — TELEPHONE ENCOUNTER
OB patient 36w6d calling with complaints of cough. She ask what medication she can take? She is advised she can take Robitussin, tylenol cough/cold, Mucinex. Patient has albuterol nebulizer that is her kids and ask if she can use. Patient advised we do not recommend using medication that is not prescribed to her. All questions answered, patient v/u.

## 2023-02-21 PROBLEM — B95.1 POSITIVE GBS TEST: Status: ACTIVE | Noted: 2023-02-21

## 2023-02-24 ENCOUNTER — ROUTINE PRENATAL (OUTPATIENT)
Dept: OBGYN CLINIC | Age: 28
End: 2023-02-24

## 2023-02-24 VITALS — WEIGHT: 176.8 LBS | DIASTOLIC BLOOD PRESSURE: 72 MMHG | BODY MASS INDEX: 32.34 KG/M2 | SYSTOLIC BLOOD PRESSURE: 128 MMHG

## 2023-02-24 DIAGNOSIS — Z34.83 PRENATAL CARE, SUBSEQUENT PREGNANCY, THIRD TRIMESTER: Primary | ICD-10-CM

## 2023-02-24 DIAGNOSIS — Z13.89 SCREENING FOR GENITOURINARY CONDITION: ICD-10-CM

## 2023-02-24 DIAGNOSIS — Z3A.37 37 WEEKS GESTATION OF PREGNANCY: ICD-10-CM

## 2023-02-24 PROCEDURE — 99902 PR PRENATAL VISIT: CPT | Performed by: OBSTETRICS & GYNECOLOGY

## 2023-02-24 NOTE — PROGRESS NOTES
Patient Active Problem List    Diagnosis Date Noted    Positive GBS test 02/21/2023    History of polyhydramnios 08/05/2022    Pregnancy 08/05/2022    Encounter for immunization 08/05/2022    ADD (attention deficit disorder) 06/11/2020   S>D   efw last visit  GBS+  [facility]

## 2023-03-03 ENCOUNTER — ROUTINE PRENATAL (OUTPATIENT)
Dept: OBGYN CLINIC | Age: 28
End: 2023-03-03

## 2023-03-03 VITALS — BODY MASS INDEX: 32.15 KG/M2 | WEIGHT: 175.8 LBS | SYSTOLIC BLOOD PRESSURE: 124 MMHG | DIASTOLIC BLOOD PRESSURE: 82 MMHG

## 2023-03-03 DIAGNOSIS — Z13.89 SCREENING FOR GENITOURINARY CONDITION: ICD-10-CM

## 2023-03-03 DIAGNOSIS — Z3A.38 38 WEEKS GESTATION OF PREGNANCY: ICD-10-CM

## 2023-03-03 DIAGNOSIS — Z34.83 PRENATAL CARE, SUBSEQUENT PREGNANCY, THIRD TRIMESTER: Primary | ICD-10-CM

## 2023-03-03 LAB
GLUCOSE URINE, POC: NEGATIVE
PROTEIN,URINE, POC: NEGATIVE

## 2023-03-07 ENCOUNTER — ANESTHESIA EVENT (OUTPATIENT)
Dept: LABOR AND DELIVERY | Age: 28
End: 2023-03-07
Payer: COMMERCIAL

## 2023-03-07 ENCOUNTER — ANESTHESIA (OUTPATIENT)
Dept: LABOR AND DELIVERY | Age: 28
End: 2023-03-07
Payer: COMMERCIAL

## 2023-03-07 ENCOUNTER — HOSPITAL ENCOUNTER (INPATIENT)
Age: 28
LOS: 2 days | Discharge: HOME | End: 2023-03-09
Attending: OBSTETRICS & GYNECOLOGY | Admitting: OBSTETRICS & GYNECOLOGY
Payer: COMMERCIAL

## 2023-03-07 DIAGNOSIS — Z3A.39 39 WEEKS GESTATION OF PREGNANCY: Primary | ICD-10-CM

## 2023-03-07 LAB
ABO + RH BLD: NORMAL
BASE DEFICIT BLD-SCNC: 11.1 MMOL/L
BASE DEFICIT BLD-SCNC: 4.2 MMOL/L
BLOOD GROUP ANTIBODIES SERPL: NORMAL
ERYTHROCYTE [DISTWIDTH] IN BLOOD BY AUTOMATED COUNT: 14.6 % (ref 11.9–14.6)
HCO3 BLD-SCNC: 19.4 MMOL/L (ref 22–26)
HCO3 BLDV-SCNC: 20.7 MMOL/L (ref 23–28)
HCT VFR BLD AUTO: 35.4 % (ref 35.8–46.3)
HGB BLD-MCNC: 11.8 G/DL (ref 11.7–15.4)
MCH RBC QN AUTO: 29.9 PG (ref 26.1–32.9)
MCHC RBC AUTO-ENTMCNC: 33.3 G/DL (ref 31.4–35)
MCV RBC AUTO: 89.6 FL (ref 82–102)
NRBC # BLD: 0 K/UL (ref 0–0.2)
PCO2 BLDCO: 37 MMHG (ref 32–68)
PCO2 BLDCO: 62 MMHG (ref 32–68)
PH BLDCO: 7.11 (ref 7.15–7.38)
PH BLDCO: 7.36 (ref 7.15–7.38)
PLATELET # BLD AUTO: 217 K/UL (ref 150–450)
PMV BLD AUTO: 11 FL (ref 9.4–12.3)
PO2 BLDCO: 24 MMHG
PO2 BLDCO: 34 MMHG
RBC # BLD AUTO: 3.95 M/UL (ref 4.05–5.2)
SAO2 % BLD: 25.7 % (ref 95–98)
SAO2 % BLDV: 63.2 % (ref 65–88)
SERVICE CMNT-IMP: ABNORMAL
SERVICE CMNT-IMP: ABNORMAL
SPECIMEN EXP DATE BLD: NORMAL
SPECIMEN TYPE: ABNORMAL
SPECIMEN TYPE: ABNORMAL
WBC # BLD AUTO: 12.5 K/UL (ref 4.3–11.1)

## 2023-03-07 PROCEDURE — 59400 OBSTETRICAL CARE: CPT | Performed by: OBSTETRICS & GYNECOLOGY

## 2023-03-07 PROCEDURE — 51701 INSERT BLADDER CATHETER: CPT

## 2023-03-07 PROCEDURE — 6370000000 HC RX 637 (ALT 250 FOR IP): Performed by: OBSTETRICS & GYNECOLOGY

## 2023-03-07 PROCEDURE — 1100000000 HC RM PRIVATE

## 2023-03-07 PROCEDURE — 7210000100 HC LABOR FEE PER 1 HR

## 2023-03-07 PROCEDURE — 36600 WITHDRAWAL OF ARTERIAL BLOOD: CPT

## 2023-03-07 PROCEDURE — 6360000002 HC RX W HCPCS: Performed by: NURSE ANESTHETIST, CERTIFIED REGISTERED

## 2023-03-07 PROCEDURE — 2500000003 HC RX 250 WO HCPCS: Performed by: NURSE ANESTHETIST, CERTIFIED REGISTERED

## 2023-03-07 PROCEDURE — 7220000101 HC DELIVERY VAGINAL/SINGLE

## 2023-03-07 PROCEDURE — 3E033VJ INTRODUCTION OF OTHER HORMONE INTO PERIPHERAL VEIN, PERCUTANEOUS APPROACH: ICD-10-PCS | Performed by: OBSTETRICS & GYNECOLOGY

## 2023-03-07 PROCEDURE — 2500000003 HC RX 250 WO HCPCS: Performed by: OBSTETRICS & GYNECOLOGY

## 2023-03-07 PROCEDURE — 7100000011 HC PHASE II RECOVERY - ADDTL 15 MIN

## 2023-03-07 PROCEDURE — 2580000003 HC RX 258: Performed by: OBSTETRICS & GYNECOLOGY

## 2023-03-07 PROCEDURE — 6360000002 HC RX W HCPCS: Performed by: OBSTETRICS & GYNECOLOGY

## 2023-03-07 PROCEDURE — 00HU33Z INSERTION OF INFUSION DEVICE INTO SPINAL CANAL, PERCUTANEOUS APPROACH: ICD-10-PCS | Performed by: ANESTHESIOLOGY

## 2023-03-07 PROCEDURE — 85027 COMPLETE CBC AUTOMATED: CPT

## 2023-03-07 PROCEDURE — 82803 BLOOD GASES ANY COMBINATION: CPT

## 2023-03-07 PROCEDURE — 7100000010 HC PHASE II RECOVERY - FIRST 15 MIN

## 2023-03-07 PROCEDURE — 3700000025 EPIDURAL BLOCK: Performed by: ANESTHESIOLOGY

## 2023-03-07 PROCEDURE — 86900 BLOOD TYPING SEROLOGIC ABO: CPT

## 2023-03-07 PROCEDURE — 10907ZC DRAINAGE OF AMNIOTIC FLUID, THERAPEUTIC FROM PRODUCTS OF CONCEPTION, VIA NATURAL OR ARTIFICIAL OPENING: ICD-10-PCS | Performed by: OBSTETRICS & GYNECOLOGY

## 2023-03-07 RX ORDER — SODIUM CHLORIDE, SODIUM LACTATE, POTASSIUM CHLORIDE, AND CALCIUM CHLORIDE .6; .31; .03; .02 G/100ML; G/100ML; G/100ML; G/100ML
500 INJECTION, SOLUTION INTRAVENOUS PRN
Status: DISCONTINUED | OUTPATIENT
Start: 2023-03-07 | End: 2023-03-07

## 2023-03-07 RX ORDER — ROPIVACAINE HYDROCHLORIDE 2 MG/ML
INJECTION, SOLUTION EPIDURAL; INFILTRATION; PERINEURAL CONTINUOUS PRN
Status: DISCONTINUED | OUTPATIENT
Start: 2023-03-07 | End: 2023-03-07 | Stop reason: SDUPTHER

## 2023-03-07 RX ORDER — MISOPROSTOL 200 UG/1
800 TABLET ORAL PRN
OUTPATIENT
Start: 2023-03-07

## 2023-03-07 RX ORDER — DOCUSATE SODIUM 100 MG/1
100 CAPSULE, LIQUID FILLED ORAL 2 TIMES DAILY
OUTPATIENT
Start: 2023-03-07

## 2023-03-07 RX ORDER — METHYLERGONOVINE MALEATE 0.2 MG/ML
200 INJECTION INTRAVENOUS PRN
OUTPATIENT
Start: 2023-03-07

## 2023-03-07 RX ORDER — ACETAMINOPHEN 500 MG
1000 TABLET ORAL EVERY 8 HOURS PRN
Status: DISCONTINUED | OUTPATIENT
Start: 2023-03-07 | End: 2023-03-09 | Stop reason: HOSPADM

## 2023-03-07 RX ORDER — ROPIVACAINE HYDROCHLORIDE 2 MG/ML
INJECTION, SOLUTION EPIDURAL; INFILTRATION; PERINEURAL PRN
Status: DISCONTINUED | OUTPATIENT
Start: 2023-03-07 | End: 2023-03-07 | Stop reason: SDUPTHER

## 2023-03-07 RX ORDER — DEXTROSE, SODIUM CHLORIDE, SODIUM LACTATE, POTASSIUM CHLORIDE, AND CALCIUM CHLORIDE 5; .6; .31; .03; .02 G/100ML; G/100ML; G/100ML; G/100ML; G/100ML
INJECTION, SOLUTION INTRAVENOUS CONTINUOUS
Status: DISCONTINUED | OUTPATIENT
Start: 2023-03-07 | End: 2023-03-07

## 2023-03-07 RX ORDER — TRANEXAMIC ACID 10 MG/ML
1000 INJECTION, SOLUTION INTRAVENOUS
OUTPATIENT
Start: 2023-03-07 | End: 2023-03-08

## 2023-03-07 RX ORDER — SODIUM CHLORIDE 0.9 % (FLUSH) 0.9 %
5-40 SYRINGE (ML) INJECTION PRN
OUTPATIENT
Start: 2023-03-07

## 2023-03-07 RX ORDER — SODIUM CHLORIDE 0.9 % (FLUSH) 0.9 %
5-40 SYRINGE (ML) INJECTION EVERY 12 HOURS SCHEDULED
OUTPATIENT
Start: 2023-03-07

## 2023-03-07 RX ORDER — ONDANSETRON 2 MG/ML
4 INJECTION INTRAMUSCULAR; INTRAVENOUS EVERY 6 HOURS PRN
Status: DISCONTINUED | OUTPATIENT
Start: 2023-03-07 | End: 2023-03-09 | Stop reason: HOSPADM

## 2023-03-07 RX ORDER — DOCUSATE SODIUM 100 MG/1
100 CAPSULE, LIQUID FILLED ORAL 2 TIMES DAILY
Status: DISCONTINUED | OUTPATIENT
Start: 2023-03-07 | End: 2023-03-09 | Stop reason: HOSPADM

## 2023-03-07 RX ORDER — SODIUM CHLORIDE 0.9 % (FLUSH) 0.9 %
5-40 SYRINGE (ML) INJECTION EVERY 12 HOURS SCHEDULED
Status: DISCONTINUED | OUTPATIENT
Start: 2023-03-07 | End: 2023-03-09 | Stop reason: HOSPADM

## 2023-03-07 RX ORDER — SODIUM CHLORIDE, SODIUM LACTATE, POTASSIUM CHLORIDE, CALCIUM CHLORIDE 600; 310; 30; 20 MG/100ML; MG/100ML; MG/100ML; MG/100ML
INJECTION, SOLUTION INTRAVENOUS CONTINUOUS
Status: DISCONTINUED | OUTPATIENT
Start: 2023-03-07 | End: 2023-03-09 | Stop reason: HOSPADM

## 2023-03-07 RX ORDER — METHYLERGONOVINE MALEATE 0.2 MG/ML
200 INJECTION INTRAVENOUS PRN
Status: DISCONTINUED | OUTPATIENT
Start: 2023-03-07 | End: 2023-03-09 | Stop reason: HOSPADM

## 2023-03-07 RX ORDER — METHYLERGONOVINE MALEATE 0.2 MG/ML
200 INJECTION INTRAVENOUS ONCE
Status: COMPLETED | OUTPATIENT
Start: 2023-03-07 | End: 2023-03-07

## 2023-03-07 RX ORDER — CARBOPROST TROMETHAMINE 250 UG/ML
250 INJECTION, SOLUTION INTRAMUSCULAR PRN
OUTPATIENT
Start: 2023-03-07

## 2023-03-07 RX ORDER — LANOLIN
CREAM (ML) TOPICAL PRN
Status: DISCONTINUED | OUTPATIENT
Start: 2023-03-07 | End: 2023-03-09 | Stop reason: HOSPADM

## 2023-03-07 RX ORDER — MISOPROSTOL 200 UG/1
200 TABLET ORAL PRN
Status: DISCONTINUED | OUTPATIENT
Start: 2023-03-07 | End: 2023-03-09 | Stop reason: HOSPADM

## 2023-03-07 RX ORDER — TRANEXAMIC ACID 10 MG/ML
1000 INJECTION, SOLUTION INTRAVENOUS ONCE
Status: COMPLETED | OUTPATIENT
Start: 2023-03-07 | End: 2023-03-07

## 2023-03-07 RX ORDER — OXYCODONE HYDROCHLORIDE 5 MG/1
5 TABLET ORAL EVERY 4 HOURS PRN
Status: DISCONTINUED | OUTPATIENT
Start: 2023-03-07 | End: 2023-03-09 | Stop reason: HOSPADM

## 2023-03-07 RX ORDER — SODIUM CHLORIDE 0.9 % (FLUSH) 0.9 %
5-40 SYRINGE (ML) INJECTION PRN
Status: DISCONTINUED | OUTPATIENT
Start: 2023-03-07 | End: 2023-03-09 | Stop reason: HOSPADM

## 2023-03-07 RX ORDER — SODIUM CHLORIDE, SODIUM LACTATE, POTASSIUM CHLORIDE, AND CALCIUM CHLORIDE .6; .31; .03; .02 G/100ML; G/100ML; G/100ML; G/100ML
1000 INJECTION, SOLUTION INTRAVENOUS PRN
Status: DISCONTINUED | OUTPATIENT
Start: 2023-03-07 | End: 2023-03-07

## 2023-03-07 RX ORDER — SODIUM CHLORIDE 9 MG/ML
INJECTION, SOLUTION INTRAVENOUS PRN
Status: DISCONTINUED | OUTPATIENT
Start: 2023-03-07 | End: 2023-03-09 | Stop reason: HOSPADM

## 2023-03-07 RX ORDER — SODIUM CHLORIDE 9 MG/ML
25 INJECTION, SOLUTION INTRAVENOUS PRN
OUTPATIENT
Start: 2023-03-07

## 2023-03-07 RX ORDER — ONDANSETRON 8 MG/1
8 TABLET, ORALLY DISINTEGRATING ORAL EVERY 8 HOURS PRN
Status: DISCONTINUED | OUTPATIENT
Start: 2023-03-07 | End: 2023-03-09 | Stop reason: HOSPADM

## 2023-03-07 RX ORDER — EPHEDRINE SULFATE/0.9% NACL/PF 50 MG/5 ML
SYRINGE (ML) INTRAVENOUS PRN
Status: DISCONTINUED | OUTPATIENT
Start: 2023-03-07 | End: 2023-03-07 | Stop reason: SDUPTHER

## 2023-03-07 RX ORDER — IBUPROFEN 800 MG/1
800 TABLET ORAL EVERY 8 HOURS
Status: DISCONTINUED | OUTPATIENT
Start: 2023-03-07 | End: 2023-03-09 | Stop reason: HOSPADM

## 2023-03-07 RX ADMIN — ROPIVACAINE HYDROCHLORIDE 8 ML/HR: 2 INJECTION, SOLUTION EPIDURAL; INFILTRATION; PERINEURAL at 11:00

## 2023-03-07 RX ADMIN — SODIUM CHLORIDE 2.5 MILLION UNITS: 9 INJECTION, SOLUTION INTRAVENOUS at 15:11

## 2023-03-07 RX ADMIN — SODIUM CHLORIDE 5 MILLION UNITS: 900 INJECTION INTRAVENOUS at 06:44

## 2023-03-07 RX ADMIN — Medication 1 MILLI-UNITS/MIN: at 06:43

## 2023-03-07 RX ADMIN — ROPIVACAINE HYDROCHLORIDE 8 ML: 2 INJECTION, SOLUTION EPIDURAL; INFILTRATION; PERINEURAL at 10:57

## 2023-03-07 RX ADMIN — ONDANSETRON 4 MG: 2 INJECTION INTRAMUSCULAR; INTRAVENOUS at 17:43

## 2023-03-07 RX ADMIN — DOCUSATE SODIUM 100 MG: 100 CAPSULE ORAL at 21:52

## 2023-03-07 RX ADMIN — SODIUM CHLORIDE 2.5 MILLION UNITS: 9 INJECTION, SOLUTION INTRAVENOUS at 10:56

## 2023-03-07 RX ADMIN — Medication 166.7 ML: at 16:56

## 2023-03-07 RX ADMIN — TRANEXAMIC ACID 1000 MG: 10 INJECTION, SOLUTION INTRAVENOUS at 17:08

## 2023-03-07 RX ADMIN — METHYLERGONOVINE MALEATE 200 MCG: 0.2 INJECTION, SOLUTION INTRAMUSCULAR; INTRAVENOUS at 17:08

## 2023-03-07 RX ADMIN — Medication 20 MG: at 11:03

## 2023-03-07 RX ADMIN — SODIUM CHLORIDE, SODIUM LACTATE, POTASSIUM CHLORIDE, CALCIUM CHLORIDE AND DEXTROSE MONOHYDRATE: 5; 600; 310; 30; 20 INJECTION, SOLUTION INTRAVENOUS at 06:43

## 2023-03-07 RX ADMIN — IBUPROFEN 800 MG: 800 TABLET, FILM COATED ORAL at 18:37

## 2023-03-07 RX ADMIN — ACETAMINOPHEN 1000 MG: 500 TABLET, FILM COATED ORAL at 21:54

## 2023-03-07 RX ADMIN — Medication 20 MG: at 11:40

## 2023-03-07 ASSESSMENT — PAIN DESCRIPTION - LOCATION: LOCATION: ABDOMEN

## 2023-03-07 ASSESSMENT — PAIN DESCRIPTION - ORIENTATION: ORIENTATION: LOWER

## 2023-03-07 ASSESSMENT — PAIN SCALES - GENERAL: PAINLEVEL_OUTOF10: 3

## 2023-03-07 ASSESSMENT — PAIN DESCRIPTION - DESCRIPTORS: DESCRIPTORS: CRAMPING

## 2023-03-07 NOTE — PROGRESS NOTES
RN at bedside assisting primary RN. Dr. Fred Interiano notified of pt status and decels. Request for internal monitors by primary RN. Per MD, no need at this time. No new orders received.

## 2023-03-07 NOTE — ANESTHESIA PROCEDURE NOTES
Epidural Block    Patient location during procedure: OB  Start time: 3/7/2023 10:48 AM  End time: 3/7/2023 10:52 AM  Reason for block: labor epidural  Staffing  Performed: anesthesiologist   Anesthesiologist: Gabriela Maria MD  Epidural  Patient position: sitting  Prep: ChloraPrep  Patient monitoring: continuous pulse ox and frequent blood pressure checks  Approach: midline  Location: L3-4  Injection technique: HAILEE saline  Provider prep: mask and sterile gloves  Needle  Needle type: Tuohy   Needle gauge: 17 G  Epidural needle length (in): 10 cm. Needle insertion depth: 5 cm  Catheter type: end hole  Catheter size: 19 G  Catheter at skin depth: 10 cm  Test dose: negativeCatheter Secured: tegaderm and tape (liquid adhesive)  Assessment  Hemodynamics: stable  Attempts: 1  Outcomes: patient tolerated procedure well and uncomplicated  Additional Notes  Risks discussed including continued pain, headache, inability to complete procedure do to complicated placement. 3 cc 1% lidocaine local at needle insertion site. Procedure performed without complication. Patient tolerated the procedure well.    Preanesthetic Checklist  Completed: patient identified, IV checked, risks and benefits discussed, equipment checked, pre-op evaluation, timeout performed, anesthesia consent given, oxygen available and monitors applied/VS acknowledged

## 2023-03-07 NOTE — PROGRESS NOTES
Cord Gas Results (Arterial)   PH 7.11     PCO2  62    PO2    24    HCO3 20    BE -10.2      Ph result reported to MD.

## 2023-03-07 NOTE — L&D DELIVERY NOTE
Mother's Information      Labor Events     Labor?: No  Cervical Ripening:   Now               Kush Mauro [[id number]]      Labor Events     Labor?: No   Steroids?: None  Cervical Ripening Date/Time:     Antibiotics Received during Labor?: Yes  Rupture Date/Time:     Rupture Type: AROM  Fluid Color: Clear  Fluid Odor: None  Fluid Volume: Moderate  Induction: Oxytocin, AROM  Labor Complications: None       Anesthesia    Method: Epidural       Start Pushing      Labor onset date/time:   Now     Dilation complete date/time:   Now     Start pushing date/time:    Decision date/time (emergent ):           Labor Length    3rd stage: 0h 03m       Delivery ([place])      Delivery Date/Time:  3/7/23 16:53:00   Delivery Type: Vaginal, Spontaneous    Details:             Presentation    Presentation: Vertex  Position: Middle  _: Occiput  _: Anterior       Shoulder Dystocia    Shoulder Dystocia Present?: No  Add Second Maneuver  Add Third Maneuver  Add Fourth Maneuver  Add Fifth Maneuver  Add Sixth Maneuver  Add Seventh Maneuver  Add Eighth Maneuver  Add Ninth Maneuver       Assisted Delivery Details    Forceps Attempted?: No  Vacuum Extractor Attempted?: No       Document Additional Attempt         Document Additional Attempt                 Cord    Vessels: 3 Vessels  Complications: Nuchal Tight  Cord Around: Shoulders  Delayed Cord Clamping?: Yes  Cord Blood Disposition: Lab  Gases Sent?: Yes       Placenta    Date/Time: 3/7/2023 16:56:00  Removal: Spontaneous  Appearance: Intact  Disposition: Discarded       Lacerations    Episiotomy: None  Perineal Lacerations: None  Other Lacerations: no non-perineal laceration       Vaginal Counts        Sponges Needles Instruments   Initial Counts      Final Counts      If the count is incorrect due to Intentionally Retained Foreign Object (IRFO) add the IRFO LDA in Lines/Drains.   Add LDA: Link to LDA       Blood Loss  Mother: Aurelio Hensley #[id number]     Start of Mother's Information      Delivery Blood Loss  23 0453 - 23 1715      None                 End of Mother's Information  Mother: Aurelio Hensley #[id number]                Delivery Providers    Delivering clinician: Aba Solis DO     Provider Role     Obstetrician     Primary Nurse     Primary  Nurse     NICU Nurse     Neonatologist     Anesthesiologist     Nurse Anesthetist     Nurse Practitioner     Midwife     Nursery Nurse              [place] Assessment    Living Status: Living     Apgar Scoring Key:    0 1 2    Skin Color: Blue or pale Acrocyanotic Completely pink    Heart Rate: Absent <100 bpm >100 bpm    Reflex Irritability: No response Grimace Cry or active withdrawal    Muscle Tone: Limp Some flexion Active motion    Respiratory Effort: Absent Weak cry; hypoventilation Good, crying                      Skin Color:   Heart Rate:   Reflex Irritability:   Muscle Tone:   Respiratory Effort: Total:            1 Minute:    0    2    2    2    2    8        Apgar 1 total from OB History    5 Minute:    1    2    2    2    2    9        Apgar 5 total from OB History    10 Minute:              15 Minute:              20 Minute:                                 Resuscitation    Method: Bulb Suction, Stimulation             [place] Measurements               Title      Skin to Skin Initiation Date/Time:       Skin to Skin End Date/Time:                  Head delivered over intact perineum with delivery of anterior shoulder. Bulb suction of mouth and nose on field. Posterior shoulder and body delivered through nuchal cord. Delayed cord clamping practiced. Cord clamped and cut and handed to awaiting nursing staff. Placenta spontaneously delivered originally uterine atony. This improved with massage and emptying bladder and pitocin bolus. Uterus manually explored and noted to be free of placenta. No repair required. Mother and baby doing well.

## 2023-03-07 NOTE — PROGRESS NOTES
Mini Luís      Dr Toni Eisenberg at bedside at [phone]. Assisted pt to sitting up on bedside at 1040. Timeout completed at [phone] with MD, BOGDAN and myself at bedside. Test dose given at 1052. Negative reaction. Dose given at 1101. Pt assisted to lying back in left tilt position. See anesthesia record for details. See vital sign flow sheet for BP. Tolerated procedure well.

## 2023-03-07 NOTE — H&P
History & Physical    Name: Kai April MRN: [id number]  SSN: [id number]    YOB: 1995  Age: 32 y.o. Sex: female      Subjective:     Estimated Date of Delivery: 3/14/23  OB History    Para Term  AB Living   3 2 2     2   SAB IAB Ectopic Molar Multiple Live Births             2      # Outcome Date GA Lbr Say/2nd Weight Sex Delivery Anes PTL Lv   3 Current            2 Term 20 37w1d 10:30 / 00:28 6 lb 4.9 oz (2.86 kg) M Vag-Spont EPI N LINDA   1 Term 17 41w1d 18:30 / 00:20 6 lb 11.4 oz (3.045 kg) Brent Ha       Ms. Eduard Sears is admitted with pregnancy at 39w0d for induction of labor due to favorable cervix at term. Prenatal course was normal.  Please see prenatal records for details. Past Medical History:   Diagnosis Date    ADD (attention deficit disorder)     Chlamydia     HPV in female     LGSIL on Pap smear of cervix     Supervision of high-risk pregnancy 10/6/2016     Past Surgical History:   Procedure Laterality Date    COLPOSCOPY  2018    TYMPANOSTOMY TUBE PLACEMENT      WISDOM TOOTH EXTRACTION       Social History     Occupational History    Not on file   Tobacco Use    Smoking status: Never    Smokeless tobacco: Never   Vaping Use    Vaping Use: Never used   Substance and Sexual Activity    Alcohol use: Not Currently    Drug use: No    Sexual activity: Yes     Partners: Male     Birth control/protection: None     Family History   Problem Relation Age of Onset    Stroke Paternal Grandmother     Stroke Paternal Grandfather     Stroke Maternal Grandfather     Heart Attack Maternal Grandfather     No Known Problems Sister     No Known Problems Mother     Ovarian Cancer Maternal Grandmother     Heart Disease Father     Hypertension Father     Diabetes Father        No Known Allergies  Prior to Admission medications    Medication Sig Start Date End Date Taking?  Authorizing Provider   Prenat w/o E-RD-Cyizmul-FA-DHA (PNV-DHA PO) Take by mouth    Historical Provider, MD        Review of Systems: A comprehensive review of systems was negative except for that written in the History of Present Illness. Objective:     Vitals:  Vitals:    03/07/23 0615 03/07/23 0645 03/07/23 0658   BP: 119/82 123/81 121/77   Pulse: (!) 106 87 88   Resp: 16 16    Temp: 98 °F (36.7 °C) 98 °F (36.7 °C)    TempSrc: Oral Oral    SpO2: 98%     Weight: 175 lb (79.4 kg)     Height: 5' 2\" (1.575 m)          Physical Exam:  General:  well nourished well developed female in nad  Heart rrr  Lungs cta b&s  Abdomen soft nontender. No enlargement of liver. Extremities: no calf pain  Pelvic exam: per office sve 2/50/-2. Membranes:  Intact  Fetal Heart Rate: Reactive          Prenatal Labs:   Lab Results   Component Value Date/Time    ABORH O POSITIVE 03/07/2023 06:39 AM    RUBELLAEXT immune 06/11/2020 12:00 AM    HBSAGEXT negative 06/11/2020 12:00 AM    HIVEXT NR 06/11/2020 12:00 AM    RPREXT NR 06/11/2020 12:00 AM       Impression/Plan:     Principal Problem:    39 weeks gestation of pregnancy  Resolved Problems:    * No resolved hospital problems. *       Plan: Admit for induction of labor. Group B Strep positive, will treat prophylactically with penicillin. Will actively manage with pitocin.

## 2023-03-07 NOTE — ANESTHESIA PRE PROCEDURE
Department of Anesthesiology  Preprocedure Note       Name:  Cande Andrews   Age:  32 y.o.  :  1995                                          MRN:  [id number]         Date:  3/7/2023      Surgeon: * No surgeons listed *    Procedure: * No procedures listed *    Medications prior to admission:   Prior to Admission medications    Medication Sig Start Date End Date Taking?  Authorizing Provider   Prenat w/o W-TN-Wurkmpe-FA-DHA (PNV-DHA PO) Take by mouth    Historical Provider, MD       Current medications:    Current Facility-Administered Medications   Medication Dose Route Frequency Provider Last Rate Last Admin    lactated ringers bolus  500 mL IntraVENous PRN Marilyn Concepcion MD        Or    lactated ringers bolus  1,000 mL IntraVENous PRN Marilyn Concepcion MD        oxytocin (PITOCIN) 30 units in 500 mL infusion  87.3 ame-units/min IntraVENous Continuous PRN Marilyn Concepcion MD        And    oxytocin (PITOCIN) 10 unit bolus from the bag  10 Units IntraVENous PRN Marilyn Concepcion MD        butorphanol (STADOL) injection 1 mg  1 mg IntraVENous Q3H PRN Marilyn Concepcion MD        oxytocin (PITOCIN) 30 units in 500 mL infusion  1-20 ame-units/min IntraVENous Continuous Marilyn Concepcion MD 10 mL/hr at 23 0958 10 ame-units/min at 23 0958    penicillin G potassium 2.5 million units in 0.9% sodium chloride 100 mL IVPB  2.5 Million Units IntraVENous Q4H Marilyn Concepcion MD   2.5 Million Units at 23 1056    dextrose 5 % in lactated ringers infusion   IntraVENous Continuous Marilyn Concepcion  mL/hr at 23 0643 New Bag at 23 2312       Allergies:  No Known Allergies    Problem List:    Patient Active Problem List   Diagnosis Code    ADD (attention deficit disorder) F98.8    History of polyhydramnios Z87.59    Pregnancy Z34.90    Encounter for immunization Z23    Positive GBS test B95.1    39 weeks gestation of pregnancy Z3A.39       Past Medical History:        Diagnosis Date    ADD (attention deficit disorder)     Chlamydia     HPV in female     LGSIL on Pap smear of cervix     Supervision of high-risk pregnancy 10/6/2016       Past Surgical History:        Procedure Laterality Date    COLPOSCOPY  12/12/2018    TYMPANOSTOMY TUBE PLACEMENT      WISDOM TOOTH EXTRACTION         Social History:    Social History     Tobacco Use    Smoking status: Never    Smokeless tobacco: Never   Substance Use Topics    Alcohol use: Not Currently                                Counseling given: Not Answered      Vital Signs (Current):   Vitals:    03/07/23 0859 03/07/23 0931 03/07/23 0959 03/07/23 1022   BP: 110/74 109/82 101/65 111/73   Pulse: 80 86 82 83   Resp:       Temp:       TempSrc:       SpO2:       Weight:       Height:                                                  BP Readings from Last 3 Encounters:   03/07/23 111/73   03/03/23 124/82   02/24/23 128/72       NPO Status:                                                                                 BMI:   Wt Readings from Last 3 Encounters:   03/07/23 175 lb (79.4 kg)   03/03/23 175 lb 12.8 oz (79.7 kg)   02/24/23 176 lb 12.8 oz (80.2 kg)     Body mass index is 32.01 kg/m². CBC:   Lab Results   Component Value Date/Time    WBC 12.5 03/07/2023 06:39 AM    RBC 3.95 03/07/2023 06:39 AM    HGB 11.8 03/07/2023 06:39 AM    HCT 35.4 03/07/2023 06:39 AM    MCV 89.6 03/07/2023 06:39 AM    RDW 14.6 03/07/2023 06:39 AM     03/07/2023 06:39 AM       CMP:   Lab Results   Component Value Date/Time    GLU 69 10/30/2020 08:04 AM       POC Tests: No results for input(s): POCGLU, POCNA, POCK, POCCL, POCBUN, POCHEMO, POCHCT in the last 72 hours.     Coags: No results found for: PROTIME, INR, APTT    HCG (If Applicable): No results found for: PREGTESTUR, PREGSERUM, HCG, HCGQUANT     ABGs:   Lab Results   Component Value Date/Time    HXZ0YMJ 25.2 12/28/2020 06:49 AM        Type & Screen (If Applicable):  No results found for: LABABO, LABRH    Drug/Infectious Status (If Applicable):  Lab Results   Component Value Date/Time    HEPCAB <0.1 08/05/2022 08:58 AM       COVID-19 Screening (If Applicable): No results found for: COVID19        Anesthesia Evaluation  Patient summary reviewed and Nursing notes reviewed  Airway: Mallampati: II  TM distance: >3 FB   Neck ROM: full  Mouth opening: > = 3 FB   Dental: normal exam         Pulmonary:Negative Pulmonary ROS and normal exam  breath sounds clear to auscultation                             Cardiovascular:Negative CV ROS  Exercise tolerance: good (>4 METS),           Rhythm: regular  Rate: normal                    Neuro/Psych:   Negative Neuro/Psych ROS              GI/Hepatic/Renal: Neg GI/Hepatic/Renal ROS            Endo/Other: Negative Endo/Other ROS                    Abdominal:             Vascular: negative vascular ROS. Other Findings:           Anesthesia Plan      epidural     ASA 2       Induction: intravenous. Anesthetic plan and risks discussed with patient.                         Александр Coburn MD   3/7/2023

## 2023-03-07 NOTE — PROGRESS NOTES
Labor Progress Note  Patient seen, fetal heart rate and contraction pattern evaluated, patient examined. Pt comfortable with an epidural.    Patient Vitals for the past 1 hrs:   BP Pulse SpO2   03/07/23 1114 (!) 115/58 84 --   03/07/23 1113 (!) 145/60 (!) 109 --   03/07/23 1111 136/69 -- --   03/07/23 1110 126/60 73 --   03/07/23 1108 125/64 74 --   03/07/23 1107 119/64 91 --   03/07/23 1106 122/61 75 --   03/07/23 1104 115/76 (!) 106 --   03/07/23 1102 (!) 99/54 97 --   03/07/23 1101 102/64 80 --   03/07/23 1100 (!) 95/53 100 --   03/07/23 1058 110/70 100 --   03/07/23 1057 114/69 99 --   03/07/23 1052 118/71 75 --   03/07/23 1050 -- 84 99 %   03/07/23 1045 -- 87 99 %   03/07/23 1044 -- -- (!) 77 %       Physical Exam:  Cervical Exam:  3 cm dilated    60% effaced    -2 station    Presenting Part: cephalic  Membranes:  Artificial Rupture of Membranes;  Amniotic Fluid: medium amount of clear fluid  Uterine Activity: Frequency: Every 2-3  minutes  Fetal Heart Rate: Reactive    Assessment/Plan:  Reassuring fetal status, Continue plan for vaginal delivery

## 2023-03-08 PROCEDURE — 6370000000 HC RX 637 (ALT 250 FOR IP): Performed by: OBSTETRICS & GYNECOLOGY

## 2023-03-08 PROCEDURE — 1100000000 HC RM PRIVATE

## 2023-03-08 RX ADMIN — IBUPROFEN 800 MG: 800 TABLET, FILM COATED ORAL at 02:52

## 2023-03-08 RX ADMIN — DOCUSATE SODIUM 100 MG: 100 CAPSULE ORAL at 15:54

## 2023-03-08 RX ADMIN — DOCUSATE SODIUM 100 MG: 100 CAPSULE ORAL at 20:04

## 2023-03-08 RX ADMIN — IBUPROFEN 800 MG: 800 TABLET, FILM COATED ORAL at 20:04

## 2023-03-08 RX ADMIN — IBUPROFEN 800 MG: 800 TABLET, FILM COATED ORAL at 10:53

## 2023-03-08 RX ADMIN — ACETAMINOPHEN 1000 MG: 500 TABLET, FILM COATED ORAL at 06:50

## 2023-03-08 RX ADMIN — ACETAMINOPHEN 1000 MG: 500 TABLET, FILM COATED ORAL at 15:50

## 2023-03-08 ASSESSMENT — PAIN DESCRIPTION - DESCRIPTORS: DESCRIPTORS: CRAMPING

## 2023-03-08 ASSESSMENT — PAIN SCALES - GENERAL: PAINLEVEL_OUTOF10: 5

## 2023-03-08 ASSESSMENT — PAIN DESCRIPTION - LOCATION: LOCATION: ABDOMEN

## 2023-03-08 NOTE — PROGRESS NOTES
Shift assessment complete. See flowsheet for details. POC reviewed with pt and pt verbalized understanding. Pt oriented to room and has call light within reach. Pt denies any needs at this time but will call out PRN.  Pt now resting in bed with  and infant at bedside

## 2023-03-08 NOTE — LACTATION NOTE
Lactation visit. 3rd time mom, baby currently in NCU, HFNC. Mom pumping. Reviewed pumping every 3 hours. 8 pump sessions ideal in 24 hours. Reviewed expected volumes. Mom confident with pump use. Offered help with pumping as needed. Has 2 medela pumps at home for use also, aware of pump rental option if needed. Encouraged skin to skin and pumping in NCU at infant bedside as able. Offered help with latching once baby able to try at breast if needed.

## 2023-03-08 NOTE — ANESTHESIA POSTPROCEDURE EVALUATION
Department of Anesthesiology  Postprocedure Note    Patient: Misty Beebe  MRN: [id number]  YOB: 1995  Date of evaluation: 3/8/2023      Procedure Summary     Date: 03/07/23 Room / Location:     Anesthesia Start: 9387 Anesthesia Stop: 8943    Procedure: Labor Analgesia Diagnosis:     Scheduled Providers:  Responsible Provider: Jael Srivastava MD    Anesthesia Type: epidural ASA Status: 2          Anesthesia Type: No value filed. [place] Phase I:      [place] Phase II:        Anesthesia Post Evaluation    Patient location during evaluation: PACU  Patient participation: complete - patient participated  Level of consciousness: awake and alert  Airway patency: patent  Nausea & Vomiting: no nausea and no vomiting  Complications: no  Cardiovascular status: hemodynamically stable  Respiratory status: acceptable, nonlabored ventilation and spontaneous ventilation  Hydration status: euvolemic  Comments: /64   Pulse 63   Temp 98 °F (36.7 °C) (Axillary)   Resp 14   Ht 5' 2\" (1.575 m)   Wt 175 lb (79.4 kg)   SpO2 99%   Breastfeeding Unknown   BMI 32.01 kg/m²   The patient was satisfied with her labor epidural and denies any complications. Her lower extremities have returned to baseline neurologically.     Multimodal analgesia pain management approach

## 2023-03-08 NOTE — PROGRESS NOTES
Post-Partum Day Number 1 Progress Note    Patient doing well post-partum without significant complaint. Voiding without difficulty, normal lochia. Infant in SCN for transitional support. Vitals:  Patient Vitals for the past 8 hrs:   BP Temp Temp src Pulse Resp SpO2   23 0905 (!) 96/59 97.6 °F (36.4 °C) Oral 71 18 99 %   23 0648 109/66 -- -- 81 -- --   23 0319 111/64 -- -- 63 -- --     Temp (24hrs), Av.8 °F (36.6 °C), Min:97.6 °F (36.4 °C), Max:98 °F (36.7 °C)      Vital signs stable, afebrile. Exam:  Patient without distress. Abdomen soft, fundus firm at level of umbilicus, nontender               Perineum with normal lochia noted. Lower extremities are negative for swelling, cords or tenderness. Lab/Data Review:  Lab Results   Component Value Date    WBC 12.5 (H) 2023    HGB 11.8 2023    HCT 35.4 (L) 2023    MCV 89.6 2023     2023         Assessment and Plan:  Patient appears to be having uncomplicated post-partum course. Continue routine perineal care and maternal education. Plan discharge tomorrow if no problems occur.

## 2023-03-08 NOTE — PLAN OF CARE
Problem: Vaginal Birth or  Section  Goal: Fetal and maternal status remain reassuring during the birth process  Description:  Birth OB-Pregnancy care plan goal which identifies if the fetal and maternal status remain reassuring during the birth process  Outcome: Completed     Problem: Pain  Goal: Verbalizes/displays adequate comfort level or baseline comfort level  Outcome: Progressing  Flowsheets (Taken 3/8/2023 0900)  Verbalizes/displays adequate comfort level or baseline comfort level:   Encourage patient to monitor pain and request assistance   Assess pain using appropriate pain scale   Administer analgesics based on type and severity of pain and evaluate response   Implement non-pharmacological measures as appropriate and evaluate response     Problem: Infection - Adult  Goal: Absence of infection at discharge  Outcome: Progressing     Problem: Safety - Adult  Goal: Free from fall injury  Outcome: Progressing

## 2023-03-09 VITALS
DIASTOLIC BLOOD PRESSURE: 75 MMHG | RESPIRATION RATE: 17 BRPM | BODY MASS INDEX: 32.2 KG/M2 | TEMPERATURE: 98.4 F | OXYGEN SATURATION: 99 % | HEIGHT: 62 IN | HEART RATE: 83 BPM | WEIGHT: 175 LBS | SYSTOLIC BLOOD PRESSURE: 119 MMHG

## 2023-03-09 PROCEDURE — 6370000000 HC RX 637 (ALT 250 FOR IP): Performed by: OBSTETRICS & GYNECOLOGY

## 2023-03-09 RX ORDER — OXYCODONE HYDROCHLORIDE 5 MG/1
5 TABLET ORAL EVERY 6 HOURS PRN
Qty: 8 TABLET | Refills: 0 | Status: SHIPPED | OUTPATIENT
Start: 2023-03-09 | End: 2023-03-12

## 2023-03-09 RX ORDER — IBUPROFEN 800 MG/1
800 TABLET ORAL EVERY 8 HOURS
Qty: 30 TABLET | Refills: 0 | Status: SHIPPED | OUTPATIENT
Start: 2023-03-09

## 2023-03-09 RX ADMIN — IBUPROFEN 800 MG: 800 TABLET, FILM COATED ORAL at 13:18

## 2023-03-09 RX ADMIN — ACETAMINOPHEN 1000 MG: 500 TABLET, FILM COATED ORAL at 09:34

## 2023-03-09 RX ADMIN — ACETAMINOPHEN 1000 MG: 500 TABLET, FILM COATED ORAL at 00:01

## 2023-03-09 RX ADMIN — DOCUSATE SODIUM 100 MG: 100 CAPSULE ORAL at 09:34

## 2023-03-09 RX ADMIN — IBUPROFEN 800 MG: 800 TABLET, FILM COATED ORAL at 06:19

## 2023-03-09 ASSESSMENT — PAIN DESCRIPTION - ORIENTATION: ORIENTATION: ANTERIOR;LOWER

## 2023-03-09 ASSESSMENT — PAIN DESCRIPTION - DESCRIPTORS: DESCRIPTORS: CRAMPING

## 2023-03-09 ASSESSMENT — PAIN DESCRIPTION - LOCATION: LOCATION: ABDOMEN

## 2023-03-09 ASSESSMENT — PAIN SCALES - GENERAL: PAINLEVEL_OUTOF10: 3

## 2023-03-09 NOTE — DISCHARGE INSTRUCTIONS
After Your Delivery (the Postpartum Period): Care Instructions  Overview     Congratulations on the birth of your baby. Like pregnancy, the  period can be a time of excitement, amado, and exhaustion. You may look at your wondrous little baby and feel happy. You may also be overwhelmed by your new sleep hours and new responsibilities. At first, babies often sleep during the days and are awake at night. They do not have a pattern or routine. They may make sudden gasps, jerk themselves awake, or look like they have crossed eyes. These are all normal, and they may even make you smile. In these first weeks after delivery, try to take good care of yourself. It may take 4 to 6 weeks to feel like yourself again, and possibly longer if you had a  birth. You will likely feel very tired for several weeks. Your days will be full of ups and downs, but lots of amado as well. Follow-up care is a key part of your treatment and safety. Be sure to make and go to all appointments, and call your doctor if you are having problems. It's also a good idea to know your test results and keep a list of the medicines you take. How can you care for yourself at home? Take care of your body after delivery  Use pads instead of tampons for the bloody flow that may last as long as 2 weeks. Ease cramps with ibuprofen (Advil, Motrin). Ease soreness of hemorrhoids and the area between your vagina and rectum with ice compresses or witch hazel pads. Ease constipation by drinking lots of fluid and eating high-fiber foods. Ask your doctor about over-the-counter stool softeners. Cleanse yourself with a gentle squeeze of warm water from a bottle instead of wiping with toilet paper. Take a sitz bath in warm water several times a day. Wear a good nursing bra. Ease sore and swollen breasts with warm, wet washcloths. If you aren't breastfeeding, use ice rather than heat for breast soreness.   Your period may not start for several months if you are breastfeeding. You may bleed more, and longer at first, than you did before you got pregnant. Wait until you are healed (about 4 to 6 weeks) before you have sex. Ask your doctor when it is okay for you to have sex. Try not to travel with your baby for 5 or 6 weeks. If you take a long car trip, make frequent stops to walk around and stretch. Avoid exhaustion  Rest every day. Try to nap when your baby naps. Ask another adult to be with you for a few days after delivery. Plan for  if you have other children. Stay flexible so you can eat at odd hours and sleep when you need to. Both you and your baby are making new schedules. Plan small trips to get out of the house. Change can make you feel less tired. Ask for help with housework, cooking, and shopping. Remind yourself that your job is to care for your baby. Know about help for postpartum depression  \"Baby blues\" are common for the first 1 to 2 weeks after birth. You may cry or feel sad or irritable for no reason. Rest whenever you can. Being tired makes it harder to handle your emotions. Go for walks with your baby. Talk to your partner, friends, and family about your feelings. If your symptoms last for more than a few weeks, or if you feel very depressed, ask your doctor for help. Postpartum depression can be treated. Support groups and counseling can help. Sometimes medicine can also help. Stay healthy  Eat healthy foods so you have more energy. If you breastfeed, avoid drugs. If you quit smoking during pregnancy, try to stay smoke-free. If you choose to have a drink now and then, have only one drink, and limit the number of occasions that you have a drink. Wait to breastfeed at least 2 hours after you have a drink to reduce the amount of alcohol the baby may get in the milk. Start daily exercise after 4 to 6 weeks, but rest when you feel tired. Learn exercises to tone your belly.  Try Kegel exercises to regain strength in your pelvic muscles. You can do these exercises while you stand or sit. (If doing these exercises causes pain, stop doing them and talk with your doctor.)  Squeeze your muscles as if you were trying not to pass gas. Or squeeze your muscles as if you were stopping the flow of urine. Your belly, legs, and buttocks shouldn't move. Hold the squeeze for 3 seconds, then relax for 5 to 10 seconds. Start with 3 seconds, then add 1 second each week until you are able to squeeze for 10 seconds. Repeat the exercise 10 times a session. Do 3 to 8 sessions a day. Find a class for you and your baby that has an exercise time. If you had a  birth, give yourself a bit more time before you exercise, and be careful. When should you call for help? Share this information with your partner, family, or a friend. They can help you watch for warning signs. Call 911  anytime you think you may need emergency care. For example, call if:    You have thoughts of harming yourself, your baby, or another person. You passed out (lost consciousness). You have chest pain, are short of breath, or cough up blood. You have a seizure. Call your doctor now or seek immediate medical care if:    You have signs of hemorrhage (too much bleeding), such as:  Heavy vaginal bleeding. This means that you are soaking through one or more pads in an hour. Or you pass blood clots bigger than an egg. Feeling dizzy or lightheaded, or you feel like you may faint. Feeling so tired or weak that you cannot do your usual activities. A fast or irregular heartbeat. New or worse belly pain. You have signs of infection, such as:  A fever. Vaginal discharge that smells bad. New or worse belly pain. You have symptoms of a blood clot in your leg (called a deep vein thrombosis), such as:  Pain in the calf, back of the knee, thigh, or groin. Redness and swelling in your leg or groin.      You have signs of preeclampsia, such as:  Sudden swelling of your face, hands, or feet. New vision problems (such as dimness, blurring, or seeing spots). A severe headache. Watch closely for changes in your health, and be sure to contact your doctor if:    Your vaginal bleeding isn't decreasing. You feel sad, anxious, or hopeless for more than a few days. You are having problems with your breasts or breastfeeding. Where can you learn more? Go to http://www.woods.com/ and enter A461 to learn more about \"After Your Delivery (the Postpartum Period): Care Instructions. \"  Current as of: February 23, 2022               Content Version: 13.5  © 8275-3234 [employer], [employer]. Care instructions adapted under license by [facility] ([facility]). If you have questions about a medical condition or this instruction, always ask your healthcare professional. [address] any warranty or liability for your use of this information.

## 2023-03-09 NOTE — CARE COORDINATION
Chart reviewed - baby in NICU (respiratory distress). SW met with patient to complete initial assessment. Patient was provided the opportunity to share how they are coping with baby's need to be in the NICU. Emotional support offered. Family lives 20-25 minutes from the hospital, and they have reliable transportation. Patient states that her parents and in-laws are available for support/assistance with their 2 older boys. She and her  have \"created a schedule\" in order to spend time at home and at the NICU. Patient denies any history of postpartum depression/anxiety. Patient given informational packet on  mood & anxiety disorders (resources/education). Family denies any additional needs from  at this time. Family has 's contact information should any needs/questions arise.     AUNG Benoit, [address] [facility]   [phone]

## 2023-03-09 NOTE — PLAN OF CARE
Problem: Pain  Goal: Verbalizes/displays adequate comfort level or baseline comfort level  3/9/2023 0713 by Teodora Hernandez RN  Outcome: Completed  3/9/2023 0713 by Teodora Hernandez RN  Outcome: Completed  3/8/2023 1924 by Anup Molina RN  Outcome: Progressing     Problem: Infection - Adult  Goal: Absence of infection at discharge  3/9/2023 0713 by Teodora Hernandez RN  Outcome: Completed  3/9/2023 0713 by Teodora Hernandez RN  Outcome: Completed  3/8/2023 1924 by Anup Molina RN  Outcome: Progressing     Problem: Safety - Adult  Goal: Free from fall injury  3/9/2023 0713 by Teodora Hernandez RN  Outcome: Completed  3/9/2023 0713 by Teodora Hernandez RN  Outcome: Completed  3/8/2023 1924 by Anup Molina RN  Outcome: Progressing

## 2023-03-09 NOTE — DISCHARGE SUMMARY
Post-Partum Day Number 2 Progress/Discharge Note    Patient doing well post-partum without significant complaint. Voiding without difficulty, normal lochia, positive flatus. Vitals:  No data found. Temp (24hrs), Av.6 °F (36.4 °C), Min:97.6 °F (36.4 °C), Max:97.7 °F (36.5 °C)      Vital signs stable, afebrile. Exam:  Patient without distress. Abdomen soft, fundus firm at level of umbilicus, non tender               Perineum with normal lochia noted. Lower extremities are negative for swelling, cords or tenderness. Lab/Data Review:  Lab Results   Component Value Date    WBC 12.5 (H) 2023    HGB 11.8 2023    HCT 35.4 (L) 2023    MCV 89.6 2023     2023         Assessment and Plan:  Patient appears to be having uncomplicated post-partum course. Continue routine perineal care and maternal education. Plan discharge for today with follow up in our office in 2 weeks.

## 2023-03-24 ENCOUNTER — POSTPARTUM VISIT (OUTPATIENT)
Dept: OBGYN CLINIC | Age: 28
End: 2023-03-24

## 2023-03-24 VITALS
SYSTOLIC BLOOD PRESSURE: 120 MMHG | HEIGHT: 62 IN | WEIGHT: 155.6 LBS | DIASTOLIC BLOOD PRESSURE: 72 MMHG | BODY MASS INDEX: 28.63 KG/M2

## 2023-03-24 NOTE — PROGRESS NOTES
3Week Postpartum Visit    Name: Celestino Kilpatrick    Date: 3/24/2023    Age: 32 y.o.    OB History          3    Para   3    Term   3            AB        Living   3         SAB        IAB        Ectopic        Molar        Multiple   0    Live Births   3              /72   Ht 5' 2\" (1.575 m)   Wt 155 lb 9.6 oz (70.6 kg)        Delivered by Dr. Vidya Mejía on 3/7/2023 by  without laceration     Infant's Gender:male  Birth Weight: &lb 8.1 oz Feeding: Breast feeding    Desired Contraception:  is getting vasectomy   Last Pap smear date:2022  Last Pap smear result: Negative STD negative     Current Outpatient Medications   Medication Sig Dispense Refill    benzocaine-menthol (DERMOPLAST) 20-0.5 % AERO spray Apply topically as needed for Pain  0    ibuprofen (ADVIL;MOTRIN) 800 MG tablet Take 1 tablet by mouth in the morning and 1 tablet at noon and 1 tablet in the evening. 30 tablet 0    witch hazel-glycerin (TUCKS) pad Place rectally as needed. 4    Prenat w/o V-LG-Lkavhic-FA-DHA (PNV-DHA PO) Take by mouth       No current facility-administered medications for this visit. No PP depression               Chuck Walters was seen today for postpartum care. Diagnoses and all orders for this visit:    Postpartum care and examination    Doesn't need a 6 week PP visit, precautions: Steffen Mendoza.   No pap due  Follow up 1 year for annual

## 2024-08-02 ENCOUNTER — OFFICE VISIT (OUTPATIENT)
Dept: OBGYN CLINIC | Age: 29
End: 2024-08-02
Payer: COMMERCIAL

## 2024-08-02 VITALS
HEIGHT: 62 IN | SYSTOLIC BLOOD PRESSURE: 108 MMHG | WEIGHT: 155.5 LBS | BODY MASS INDEX: 28.61 KG/M2 | DIASTOLIC BLOOD PRESSURE: 62 MMHG

## 2024-08-02 DIAGNOSIS — Z01.419 WELL WOMAN EXAM: Primary | ICD-10-CM

## 2024-08-02 DIAGNOSIS — Z13.89 SCREENING FOR GENITOURINARY CONDITION: ICD-10-CM

## 2024-08-02 DIAGNOSIS — Z11.51 SCREENING FOR HPV (HUMAN PAPILLOMAVIRUS): ICD-10-CM

## 2024-08-02 DIAGNOSIS — Z12.4 SCREENING FOR CERVICAL CANCER: ICD-10-CM

## 2024-08-02 DIAGNOSIS — Z01.419 WELL WOMAN EXAM: ICD-10-CM

## 2024-08-02 LAB
BILIRUBIN, URINE, POC: NEGATIVE
BLOOD URINE, POC: NEGATIVE
GLUCOSE URINE, POC: NEGATIVE
KETONES, URINE, POC: NEGATIVE
LEUKOCYTE ESTERASE, URINE, POC: NEGATIVE
NITRITE, URINE, POC: NEGATIVE
PH, URINE, POC: 7 (ref 4.6–8)
PROTEIN,URINE, POC: NEGATIVE
SPECIFIC GRAVITY, URINE, POC: 1.02 (ref 1–1.03)
URINALYSIS CLARITY, POC: CLEAR
URINALYSIS COLOR, POC: YELLOW
UROBILINOGEN, POC: NORMAL

## 2024-08-02 PROCEDURE — 81003 URINALYSIS AUTO W/O SCOPE: CPT | Performed by: NURSE PRACTITIONER

## 2024-08-02 PROCEDURE — 99395 PREV VISIT EST AGE 18-39: CPT | Performed by: NURSE PRACTITIONER

## 2024-08-02 RX ORDER — DEXMETHYLPHENIDATE HYDROCHLORIDE 10 MG/1
CAPSULE, EXTENDED RELEASE ORAL
COMMUNITY
Start: 2024-07-19

## 2024-08-02 NOTE — PROGRESS NOTES
Insecurity: Not on file   Transportation Needs: Not on file   Physical Activity: Not on file   Stress: Not on file   Social Connections: Not on file   Intimate Partner Violence: Not on file   Housing Stability: Not on file       Family History:  Family History   Problem Relation Age of Onset    Stroke Paternal Grandmother     Stroke Paternal Grandfather     Stroke Maternal Grandfather     Heart Attack Maternal Grandfather     No Known Problems Sister     No Known Problems Mother     Ovarian Cancer Maternal Grandmother     Heart Disease Father     Hypertension Father     Diabetes Father        Review of Systems - General ROS: negative except for that discussed in HPI      ROS:  Feeling well. No dyspnea or chest pain on exertion.  No abdominal pain, change in bowel habits, black or bloody stools.  No urinary tract symptoms. No neurological complaints.    Objective:   /62   Ht 1.575 m (5' 2\")   Wt 70.5 kg (155 lb 8 oz)   LMP 07/08/2024 (Exact Date)   BMI 28.44 kg/m²   The patient appears well, alert, oriented x 3, in no distress.  ENT normal.  Neck supple. No adenopathy or thyromegaly.   Lungs:  clear, good air entry, no wheezes, rhonchi or rales.   Heart:  S1 and S2 normal, no murmurs, regular rate and rhythm.  Abdomen:  soft without tenderness, guarding, mass or organomegaly.   Extremities show no edema, normal peripheral pulses.   Neurological is normal, no focal findings.    BREAST EXAM: breasts appear normal, no suspicious masses, no skin or nipple changes or axillary nodes, symmetric fibrous changes bilaterally    PELVIC EXAM: VULVA: normal appearing vulva with no masses, tenderness or lesions, VAGINA: normal appearing vagina with normal color and discharge, no lesions, CERVIX: normal appearing cervix without discharge or lesions, UTERUS: uterus is normal size, shape, consistency and nontender, ADNEXA: normal adnexa in size, nontender and no masses    Assessment/Plan:     1. Screening for genitourinary

## 2024-08-07 LAB
COLLECTION METHOD: NORMAL
CYTOLOGIST CVX/VAG CYTO: NORMAL
CYTOLOGY CVX/VAG DOC THIN PREP: NORMAL
HPV APTIMA: NEGATIVE
Lab: NORMAL
PAP SOURCE: NORMAL
PATH REPORT.FINAL DX SPEC: NORMAL
STAT OF ADQ CVX/VAG CYTO-IMP: NORMAL